# Patient Record
Sex: MALE | Race: WHITE | NOT HISPANIC OR LATINO | Employment: UNEMPLOYED | URBAN - NONMETROPOLITAN AREA
[De-identification: names, ages, dates, MRNs, and addresses within clinical notes are randomized per-mention and may not be internally consistent; named-entity substitution may affect disease eponyms.]

---

## 2018-05-09 ENCOUNTER — OFFICE VISIT CONVERTED (OUTPATIENT)
Dept: FAMILY MEDICINE CLINIC | Age: 68
End: 2018-05-09
Attending: NURSE PRACTITIONER

## 2019-01-04 ENCOUNTER — HOSPITAL ENCOUNTER (OUTPATIENT)
Dept: OTHER | Facility: HOSPITAL | Age: 69
Discharge: HOME | End: 2019-01-04
Attending: NURSE PRACTITIONER

## 2019-01-04 ENCOUNTER — OFFICE VISIT CONVERTED (OUTPATIENT)
Dept: FAMILY MEDICINE CLINIC | Age: 69
End: 2019-01-04
Attending: NURSE PRACTITIONER

## 2019-01-04 LAB
ALBUMIN SERPL-MCNC: 4.5 G/DL (ref 3.5–5)
ALBUMIN/GLOB SERPL: 1.5 {RATIO} (ref 1.4–2.6)
ALP SERPL-CCNC: 72 U/L (ref 56–155)
ALT SERPL-CCNC: 26 U/L (ref 10–40)
ANION GAP SERPL CALC-SCNC: 22 MMOL/L (ref 8–19)
AST SERPL-CCNC: 23 U/L (ref 15–50)
BILIRUB SERPL-MCNC: 0.42 MG/DL (ref 0.2–1.3)
BUN SERPL-MCNC: 10 MG/DL (ref 5–25)
BUN/CREAT SERPL: 12 {RATIO} (ref 6–20)
CALCIUM SERPL-MCNC: 8.9 MG/DL (ref 8.7–10.4)
CHLORIDE SERPL-SCNC: 95 MMOL/L (ref 99–111)
CHOLEST SERPL-MCNC: 197 MG/DL (ref 107–200)
CHOLEST/HDLC SERPL: 3.3 {RATIO} (ref 3–6)
CONV CO2: 22 MMOL/L (ref 22–32)
CONV TOTAL PROTEIN: 7.6 G/DL (ref 6.3–8.2)
CREAT UR-MCNC: 0.85 MG/DL (ref 0.7–1.2)
ERYTHROCYTE [DISTWIDTH] IN BLOOD BY AUTOMATED COUNT: 11.9 % (ref 11.5–14.5)
GFR SERPLBLD BASED ON 1.73 SQ M-ARVRAT: >60 ML/MIN/{1.73_M2}
GLOBULIN UR ELPH-MCNC: 3.1 G/DL (ref 2–3.5)
GLUCOSE SERPL-MCNC: 85 MG/DL (ref 70–99)
HBA1C MFR BLD: 16.5 G/DL (ref 14–18)
HCT VFR BLD AUTO: 47.3 % (ref 42–52)
HDLC SERPL-MCNC: 59 MG/DL (ref 40–60)
LDLC SERPL CALC-MCNC: 115 MG/DL (ref 70–100)
MCH RBC QN AUTO: 30.7 PG (ref 27–31)
MCHC RBC AUTO-ENTMCNC: 34.9 G/DL (ref 33–37)
MCV RBC AUTO: 88.1 FL (ref 80–96)
OSMOLALITY SERPL CALC.SUM OF ELEC: 278 MOSM/KG (ref 273–304)
PLATELET # BLD AUTO: 263 10*3/UL (ref 130–400)
PMV BLD AUTO: 9 FL (ref 7.4–10.4)
POTASSIUM SERPL-SCNC: 4 MMOL/L (ref 3.5–5.3)
PSA SERPL-MCNC: 2.41 NG/ML (ref 0–4)
RBC # BLD AUTO: 5.37 10*6/UL (ref 4.7–6.1)
SODIUM SERPL-SCNC: 135 MMOL/L (ref 135–147)
TRIGL SERPL-MCNC: 117 MG/DL (ref 40–150)
VLDLC SERPL-MCNC: 23 MG/DL (ref 5–37)
WBC # BLD AUTO: 7.18 10*3/UL (ref 4.8–10.8)

## 2019-09-03 ENCOUNTER — OFFICE VISIT CONVERTED (OUTPATIENT)
Dept: FAMILY MEDICINE CLINIC | Age: 69
End: 2019-09-03
Attending: FAMILY MEDICINE

## 2019-09-04 LAB
ALBUMIN SERPL-MCNC: 4.5 G/DL
ALBUMIN/GLOB SERPL: 1.4 {RATIO}
ALP SERPL-CCNC: 83 IU/L
ALT SERPL-CCNC: 21 IU/L
AST SERPL-CCNC: 19 IU/L
BILIRUB SERPL-MCNC: 0.9 MG/DL
BUN SERPL-MCNC: 12 MG/DL
BUN/CREAT SERPL: 13
CALCIUM SERPL-MCNC: 9.2 MG/DL
CHLORIDE SERPL-SCNC: 94 MMOL/L
CHOLEST SERPL-MCNC: 229 MG/DL
CO2 SERPL-SCNC: 23 MMOL/L
CONV TOTAL PROTEIN: 7.7 G/DL
CREAT UR-MCNC: 0.96 MG/DL
GLOBULIN UR ELPH-MCNC: 3.2 G/DL
GLUCOSE SERPL-MCNC: 94 MG/DL
HDLC SERPL-MCNC: 61 MG/DL
LDLC SERPL CALC-MCNC: 147 MG/DL
POTASSIUM SERPL-SCNC: 4.6 MMOL/L
SODIUM SERPL-SCNC: 134 MMOL/L
TRIGL SERPL-MCNC: 105 MG/DL
VLDLC SERPL-MCNC: 21 MG/DL

## 2019-10-07 ENCOUNTER — OFFICE VISIT CONVERTED (OUTPATIENT)
Dept: FAMILY MEDICINE CLINIC | Age: 69
End: 2019-10-07
Attending: FAMILY MEDICINE

## 2020-03-03 ENCOUNTER — OFFICE VISIT CONVERTED (OUTPATIENT)
Dept: FAMILY MEDICINE CLINIC | Age: 70
End: 2020-03-03
Attending: FAMILY MEDICINE

## 2020-05-15 ENCOUNTER — OFFICE VISIT CONVERTED (OUTPATIENT)
Dept: FAMILY MEDICINE CLINIC | Age: 70
End: 2020-05-15
Attending: FAMILY MEDICINE

## 2020-06-30 ENCOUNTER — HOSPITAL ENCOUNTER (OUTPATIENT)
Dept: OTHER | Facility: HOSPITAL | Age: 70
Discharge: HOME | End: 2020-06-30
Attending: FAMILY MEDICINE

## 2020-06-30 ENCOUNTER — OFFICE VISIT CONVERTED (OUTPATIENT)
Dept: FAMILY MEDICINE CLINIC | Age: 70
End: 2020-06-30
Attending: FAMILY MEDICINE

## 2020-06-30 LAB
ALBUMIN SERPL-MCNC: 4.7 G/DL (ref 3.5–5)
ALBUMIN/GLOB SERPL: 1.6 {RATIO} (ref 1.4–2.6)
ALP SERPL-CCNC: 65 U/L (ref 56–155)
ALT SERPL-CCNC: 41 U/L (ref 10–40)
ANION GAP SERPL CALC-SCNC: 22 MMOL/L (ref 8–19)
AST SERPL-CCNC: 57 U/L (ref 15–50)
BASOPHILS # BLD MANUAL: 0.03 10*3/UL (ref 0–0.2)
BASOPHILS NFR BLD MANUAL: 0.3 % (ref 0–3)
BILIRUB SERPL-MCNC: 2.02 MG/DL (ref 0.2–1.3)
BUN SERPL-MCNC: 10 MG/DL (ref 5–25)
BUN/CREAT SERPL: 12 {RATIO} (ref 6–20)
CALCIUM SERPL-MCNC: 9.9 MG/DL (ref 8.7–10.4)
CHLORIDE SERPL-SCNC: 90 MMOL/L (ref 99–111)
CHOLEST SERPL-MCNC: 189 MG/DL (ref 107–200)
CHOLEST/HDLC SERPL: 1.5 {RATIO} (ref 3–6)
CONV CO2: 22 MMOL/L (ref 22–32)
CONV TOTAL PROTEIN: 7.6 G/DL (ref 6.3–8.2)
CREAT UR-MCNC: 0.81 MG/DL (ref 0.7–1.2)
DEPRECATED RDW RBC AUTO: 41.4 FL
EOSINOPHIL # BLD MANUAL: 0.05 10*3/UL (ref 0–0.7)
EOSINOPHIL NFR BLD MANUAL: 0.5 % (ref 0–7)
ERYTHROCYTE [DISTWIDTH] IN BLOOD BY AUTOMATED COUNT: 12.7 % (ref 11.5–14.5)
FOLATE SERPL-MCNC: 7.2 NG/ML (ref 4.8–20)
GFR SERPLBLD BASED ON 1.73 SQ M-ARVRAT: >60 ML/MIN/{1.73_M2}
GLOBULIN UR ELPH-MCNC: 2.9 G/DL (ref 2–3.5)
GLUCOSE SERPL-MCNC: 105 MG/DL (ref 70–99)
GRANS (ABSOLUTE): 7.12 10*3/UL (ref 2–8)
GRANS: 74.8 % (ref 30–85)
HBA1C MFR BLD: 14.4 G/DL (ref 14–18)
HCT VFR BLD AUTO: 40.3 % (ref 42–52)
HDLC SERPL-MCNC: 126 MG/DL (ref 40–60)
IMM GRANULOCYTES # BLD: 0.02 10*3/UL (ref 0–0.54)
IMM GRANULOCYTES NFR BLD: 0.2 % (ref 0–0.43)
LDLC SERPL CALC-MCNC: 55 MG/DL (ref 70–100)
LYMPHOCYTES # BLD MANUAL: 1.43 10*3/UL (ref 1–5)
LYMPHOCYTES NFR BLD MANUAL: 9.2 % (ref 3–10)
MAGNESIUM SERPL-MCNC: 2.08 MG/DL (ref 1.6–2.3)
MCH RBC QN AUTO: 32.1 PG (ref 27–31)
MCHC RBC AUTO-ENTMCNC: 35.7 G/DL (ref 33–37)
MCV RBC AUTO: 89.8 FL (ref 80–96)
MONOCYTES # BLD AUTO: 0.88 10*3/UL (ref 0.2–1.2)
OSMOLALITY SERPL CALC.SUM OF ELEC: 269 MOSM/KG (ref 273–304)
PLATELET # BLD AUTO: 205 10*3/UL (ref 130–400)
PMV BLD AUTO: 8.2 FL (ref 7.4–10.4)
POTASSIUM SERPL-SCNC: 4.2 MMOL/L (ref 3.5–5.3)
PSA SERPL-MCNC: 4.09 NG/ML (ref 0–4)
RBC # BLD AUTO: 4.49 10*6/UL (ref 4.7–6.1)
SODIUM SERPL-SCNC: 130 MMOL/L (ref 135–147)
T4 FREE SERPL-MCNC: 1.3 NG/DL (ref 0.9–1.8)
TRIGL SERPL-MCNC: 42 MG/DL (ref 40–150)
TSH SERPL-ACNC: 2.09 M[IU]/L (ref 0.27–4.2)
VARIANT LYMPHS NFR BLD MANUAL: 15 % (ref 20–45)
VIT B12 SERPL-MCNC: 380 PG/ML (ref 211–911)
VLDLC SERPL-MCNC: 8 MG/DL (ref 5–37)
WBC # BLD AUTO: 9.53 10*3/UL (ref 4.8–10.8)

## 2020-07-08 ENCOUNTER — HOSPITAL ENCOUNTER (OUTPATIENT)
Dept: OTHER | Facility: HOSPITAL | Age: 70
Discharge: HOME | End: 2020-07-08
Attending: FAMILY MEDICINE

## 2020-07-08 LAB
ANION GAP SERPL CALC-SCNC: 15 MMOL/L (ref 8–19)
BUN SERPL-MCNC: 13 MG/DL (ref 5–25)
BUN/CREAT SERPL: 15 {RATIO} (ref 6–20)
CALCIUM SERPL-MCNC: 9 MG/DL (ref 8.7–10.4)
CHLORIDE SERPL-SCNC: 99 MMOL/L (ref 99–111)
CONV CO2: 28 MMOL/L (ref 22–32)
CREAT UR-MCNC: 0.85 MG/DL (ref 0.7–1.2)
GFR SERPLBLD BASED ON 1.73 SQ M-ARVRAT: >60 ML/MIN/{1.73_M2}
GLUCOSE SERPL-MCNC: 81 MG/DL (ref 70–99)
OSMOLALITY SERPL CALC.SUM OF ELEC: 285 MOSM/KG (ref 273–304)
OSMOLALITY UR: 487 MOSM/KG (ref 50–1400)
POTASSIUM SERPL-SCNC: 4.4 MMOL/L (ref 3.5–5.3)
SODIUM SERPL-SCNC: 138 MMOL/L (ref 135–147)
SODIUM UR-SCNC: 35 MMOL/L

## 2020-07-10 ENCOUNTER — HOSPITAL ENCOUNTER (OUTPATIENT)
Dept: OTHER | Facility: HOSPITAL | Age: 70
Discharge: HOME | End: 2020-07-10
Attending: FAMILY MEDICINE

## 2020-08-03 ENCOUNTER — HOSPITAL ENCOUNTER (OUTPATIENT)
Dept: OTHER | Facility: HOSPITAL | Age: 70
Discharge: HOME | End: 2020-08-03
Attending: FAMILY MEDICINE

## 2020-08-05 ENCOUNTER — OFFICE VISIT CONVERTED (OUTPATIENT)
Dept: SURGERY | Facility: CLINIC | Age: 70
End: 2020-08-05
Attending: NURSE PRACTITIONER

## 2020-08-05 ENCOUNTER — HOSPITAL ENCOUNTER (OUTPATIENT)
Dept: SURGERY | Facility: CLINIC | Age: 70
Discharge: HOME | End: 2020-08-05
Attending: NURSE PRACTITIONER

## 2020-08-05 LAB
APPEARANCE UR: ABNORMAL
BILIRUB UR QL: NEGATIVE
CASTS URNS QL MICRO: ABNORMAL /[LPF]
COLOR UR: YELLOW
CONV BACTERIA: ABNORMAL
CONV COLLECTION SOURCE (UA): ABNORMAL
CONV UROBILINOGEN IN URINE BY AUTOMATED TEST STRIP: 1 {EHRLICHU}/DL (ref 0.1–1)
GLUCOSE UR QL: NEGATIVE MG/DL
HGB UR QL STRIP: ABNORMAL
KETONES UR QL STRIP: NEGATIVE MG/DL
LEUKOCYTE ESTERASE UR QL STRIP: ABNORMAL
NITRITE UR QL STRIP: NEGATIVE
PH UR STRIP.AUTO: 7.5 [PH] (ref 5–8)
PROT UR QL: ABNORMAL MG/DL
RBC #/AREA URNS HPF: ABNORMAL /[HPF]
SP GR UR: 1.02 (ref 1–1.03)
WBC #/AREA URNS HPF: ABNORMAL /[HPF]

## 2020-08-08 LAB
AMPICILLIN SUSC ISLT: <=0.25
BACTERIA UR CULT: ABNORMAL
CEFOTAXIME SUSC ISLT: <=0.12
CEFTRIAXONE SUSC ISLT: <=0.12
LEVOFLOXACIN SUSC ISLT: 1
PENICILLIN G SUSC ISLT: <=0.06
TETRACYCLINE SUSC ISLT: <=0.25
TIGECYCLINE SUSC ISLT: <=0.06
VANCOMYCIN SUSC ISLT: 0.25

## 2020-08-26 ENCOUNTER — TELEPHONE CONVERTED (OUTPATIENT)
Dept: SURGERY | Facility: CLINIC | Age: 70
End: 2020-08-26
Attending: NURSE PRACTITIONER

## 2020-09-03 ENCOUNTER — HOSPITAL ENCOUNTER (OUTPATIENT)
Dept: OTHER | Facility: HOSPITAL | Age: 70
Discharge: HOME | End: 2020-09-03
Attending: FAMILY MEDICINE

## 2020-09-03 LAB
CREAT BLD-MCNC: 0.8 MG/DL (ref 0.6–1.4)
GFR SERPLBLD BASED ON 1.73 SQ M-ARVRAT: >60 ML/MIN/{1.73_M2}

## 2020-11-04 ENCOUNTER — HOSPITAL ENCOUNTER (OUTPATIENT)
Dept: OTHER | Facility: HOSPITAL | Age: 70
Discharge: HOME | End: 2020-11-04
Attending: NURSE PRACTITIONER

## 2021-03-03 ENCOUNTER — OFFICE VISIT CONVERTED (OUTPATIENT)
Dept: FAMILY MEDICINE CLINIC | Age: 71
End: 2021-03-03
Attending: FAMILY MEDICINE

## 2021-03-18 ENCOUNTER — CONVERSION ENCOUNTER (OUTPATIENT)
Dept: FAMILY MEDICINE CLINIC | Age: 71
End: 2021-03-18

## 2021-03-19 LAB
ALBUMIN SERPL-MCNC: 4.2 G/DL
ALBUMIN/GLOB SERPL: 1.6 {RATIO}
ALP SERPL-CCNC: 71 IU/L
ALT SERPL-CCNC: 16 IU/L
AST SERPL-CCNC: 19 IU/L
BILIRUB SERPL-MCNC: 0.5 MG/DL
BUN SERPL-MCNC: 16 MG/DL
BUN/CREAT SERPL: 17
CALCIUM SERPL-MCNC: 9.2 MG/DL
CHLORIDE SERPL-SCNC: 104 MMOL/L
CHOLEST SERPL-MCNC: 195 MG/DL
CO2 SERPL-SCNC: 27 MMOL/L
CONV TOTAL PROTEIN: 6.9 G/DL
CREAT UR-MCNC: 0.92 MG/DL
GLOBULIN UR ELPH-MCNC: 2.7 G/DL
GLUCOSE SERPL-MCNC: 89 MG/DL
HDLC SERPL-MCNC: 54 MG/DL
LDLC SERPL CALC-MCNC: 129 MG/DL
POTASSIUM SERPL-SCNC: 4.6 MMOL/L
PSA SERPL-MCNC: 2.2 NG/ML
SODIUM SERPL-SCNC: 143 MMOL/L
TRIGL SERPL-MCNC: 67 MG/DL
VLDLC SERPL-MCNC: 12 MG/DL

## 2021-05-10 NOTE — H&P
"   History and Physical      Patient Name: Percy Mcdaniel   Patient ID: [id number]   Sex: Male   YOB: 1950    Primary Care Provider: Natalie Pierce MD   Referring Provider: Natalie Pierce MD    Visit Date: August 5, 2020    Provider: LIVIA Escobar   Location[facility]   Location Address: [address] [place] [address]  [id number]   Location Phone: [phone]          Chief Complaint  · Decreased urinary stream when voiding  · \"Getting up frequently at night\"  · Elevated PSA  · \"My doctor sent me because my blood test is high\"      History Of Present Illness  The patient is a 70 year old /White male, who presents on referral from Natalie Pierce MD, for an urological evaluation for an elevated PSA. The PSA was noted as elevated on 06/30/2020 and stated to be mildly elevated and at a level of 4.09 . There has not been a repeat PSA since the last elevated one on 06/30/2020   The patient also reports slowing of his stream and nocturia, 3-4 times a night. Additionally, he denies burning, chills, fever, frequency, hematuria, hesitancy, incomplete emptying, and previous UTI's.   The patient is currently not taking any Urology specific medications.   Petinent studies:  To date, no diagnostic studies have been performed. He has not had any recent care for this condition.      Patient reports that he is having trouble with his urinary stream cuts out at the end of his void.      Denies any post void dribbling.      Admits to Nocturia 3-4 x/night.      Denies taking any urology meds.      Admits to a brother and uncle with prostate cancer.    CT abd w/wo contrast (8/2020) reveals:  1.  Lesion in segment 2 of the liver that may relate to hemangioma although difficult to confirm on this exam. MRI recommended to better reassess this.  2.  Previously noted gallbladder polyp or stone not well imaged on this exam.  3.  Bilateral renal cyst.    4.  There is a 1.08 cm calculi in the right mid " pole kidney.    Previous psa's:  6/2020: 4.09  1/2019: 2.41  10/2017: 3.3  7/22016: 3.3  11/2014: 1.7       Past Medical History  Disease Name Date Onset Notes   Allergic rhinitis, chronic --  --    Allergies --  --    High blood pressure --  --    Hypertension --  --          Past Surgical History  Procedure Name Date Notes   Nasal polyp surgery --  --    Vasectomy 1980 --          Allergy List  Allergen Name Date Reaction Notes   NO KNOWN DRUG ALLERGIES --  --  --          Family Medical History  Disease Name Relative/Age Notes   Stomach Neoplasm, Malignant Aunt/   --    Diabetes, unspecified type  Uncle (paternal)   Lung cancer  --    Prostate cancer Brother/   Uncle (maternal); Brother   Family history of breast cancer  Aunt/50s         Social History  Finding Status Start/Stop Quantity Notes   Tobacco Never --/-- --  05/10/2017 - never 04/12/2017 - never          Review of Systems  · Constitutional  o Denies  o : fever, chills  · HENT  o Denies  o : sore throat, nasal congestion, nasal discharge, sinus pain, headaches  · Cardiovascular  o Denies  o : chest pain, cardiac murmurs, irregular heart beats, dyspnea on exertion  · Respiratory  o Denies  o : shortness of breath, wheezing  · Gastrointestinal  o Denies  o : nausea, vomiting, change in abdominal girth, diarrhea, constipation, blood in stools  · Genitourinary  o Admits  o : nocturia  o Denies  o : urgency, frequency, dysuria, hematuria, pyuria, oliguria, change in urine color, incontinence, urinary retention, difficulty voiding, urinary hesitancy, decreased stream, post-void dribbling, scrotal pain, penile discharge, additional symptoms, except as noted in HPI  · Integument  o Denies  o : rash, itching, new skin lesions  · Neurologic  o Denies  o : tingling or numbness, incoordination, seizures  · Endocrine  o Denies  o : polyuria, polydipsia, cold intolerance, heat intolerance, weight gain, weight loss  · Psychiatric  o Denies  o : anxiety,  "depression      Vitals  Date Time BP Position Site L\R Cuff Size HR RR TEMP (F) WT  HT  BMI kg/m2 BSA m2 O2 Sat HC       08/05/2020 10:24 AM       12  199lbs 6oz 6'  1\" 26.3 2.16           Physical Examination  · Constitutional  o Appearance  o : Well nourished, well developed patient in no acute distress. Ambulating without difficulty.  · Neck  o Thyroid  o : Normal size without tenderness, nodules or masses  · Respiratory  o Respiratory Effort  o : Breathing is unlabored without accessory muscle use  o Auscultation of Lungs  o : Normal breath sounds  · Gastrointestinal  o Abdominal Examination  o : Scaphoid abdomen which is non-tender to palpation with normal tone and without rigidity or guarding. Normal bowel sounds. No masses present.  o Liver and spleen  o : No hepatomegaly present. Liver is non-tender to palpation and spleen is not palpable.  o Hernias  o : No abdominal wall hernias are present.  · Genitourinary  o Penis  o : Normal appearance without lesion, discharge or masses.  · Lymphatic  o Neck  o : No lymphadenopathy present  o Axilla  o : No lymphadenopathy present  o Groin  o : No lymphadenopathy present  · Skin and Subcutaneous Tissue  o General Inspection  o : No rashes, lesions or areas of discoloration present. Skin turgor is normal.  o General Palpation  o : No abnormalities, masses or tenderness on palpation.          Results  · In-Office Procedures  o Lab procedure  § Automated Dipstick Urinalysis (Surg Spec) WITHOUT Micro [facility] ([id number])   § Color Ur: Yellow   § Clarity Ur: Clear   § Glucose Ur Ql Strip: Negative   § Bilirub Ur Ql Strip: Negative   § Ketones Ur Ql Strip: Negative   § Sp Gr Ur Qn: 1.015   § Hgb Ur Ql Strip: Trace-Intact   § pH Ur-LsCnc: 7.5   § Prot Ur Ql Strip: 30 mg/dL   § Urobilinogen Ur Strip-mCnc: 2.0 E.U./dL   § Nitrite Ur Ql Strip: Negative   § WBC Est Ur Ql Strip: Large       Assessment  · Elevated prostate specific antigen " (PSA)     790.93/R97.20  · Nocturia     788.43/R35.1  · Urinary stream slowing     788.62/R39.198  · Liver lesion     573.8/K76.9      Plan  · Orders  o Urinalysis with Reflex Microscopy if abnormal ([facility]) ([id number]) - 790.93/R97.20, 788.43/R35.1 - 08/05/2020  o Urine Culture (Clean Catch) [facility] ([id number]) - 790.93/R97.20, 788.43/R35.1 - 08/05/2020  o MRI prostate wo then w contrast ([id number]) - 790.93/R97.20, 788.43/R35.1, 788.62/R39.198 - 08/05/2020   To be performed @ [facility]  o MRI abdomen w/ contrast ([id number]) - 573.8/K76.9 - 08/05/2020   To be performed @ [facility]   · Medications  o Medications have been Reconciled  o Transition of Care or Provider Policy  · Instructions  o DISCUSSION AND PLAN: I have explained that there is no PSA level which patient can be told that he does not have prostate cancer. I have discussed that prostate biopsy is the only way to diagnose prostate cancer. Given patient's family history of prostate cancer he is wishing to proceed with MRI.  o Kidney stones will be addressed at follow-up after imaging.   o Will follow-up with a telephone visit post imaging.   o Electronically Identified Patient Education Materials Provided Electronically            Electronically Signed by: LIVIA Escobar -Author on August 5, 2020 08:00:08 PM

## 2021-05-13 NOTE — PROGRESS NOTES
"   Quick Note      Patient Name: Percy Mcdaniel   Patient ID: [id number]   Sex: Male   YOB: 1950    Primary Care Provider: Natalie Pierce MD   Referring Provider: Natalie Pierce MD    Visit Date: August 26, 2020    Provider: LIVIA Escobar   Location: [facility]   Location Address: [address] [place] [address]  [id number]   Location Phone: [phone]          History Of Present Illness  TELEHEALTH TELEPHONE VISIT  Chief Complaint: follow-up post imaging   Percy Mcdaniel is a 70 year old /White male who is presenting for evaluation via telehealth telephone visit. Verbal consent obtained before beginning visit.   Provider spent 15 minutes minutes with the patient during the telehealth visit.   The following staff were present during this visit: Ayala Esparza- MICHELLE   Past Medical History/ Overview of Patient Symptoms     This is a telephone visit to discuss MRI of the prostate.    MRI of prostate reveals:  1. Prostate measures 5.1 x 3.9 x 4.3cm with total volume of 41.15mL.  2. Heterogeneous signal intensity and enhancement is in the enlarged central gland is related to BPH.  3.  No suspicious T2 localizing lesion to suggest significant prostate malignancy given limitations of motion artifact and susceptibility artifact from air in the rectum.  4.  Chronic prostatitis.       Vitals  Date Time BP Position Site L\R Cuff Size HR RR TEMP (F) WT  HT  BMI kg/m2 BSA m2 O2 Sat FR L/min FiO2 HC       08/26/2020 11:33 AM         200lbs 0oz 6'  1\" 26.39 2.16                 Assessment  · Elevated PSA     790.93/R97.20  · BPH (benign prostatic hyperplasia)     600.00/N40.0  · Chronic prostatitis     601.1/N41.1    Problems Reconciled  Plan  · Orders  o Physician Telephone Evaluation, 11-20 minutes (54742) - 790.93/R97.20, 600.00/N40.0, 601.1/N41.1 - 08/26/2020  o PSA ultrasensitive DIAGNOSTIC [facility] ([id number]) - 790.93/R97.20, 600.00/N40.0, 601.1/N41.1 - " 10/20/2020  · Medications  o Medications have been Reconciled  o Transition of Care or Provider Policy  · Instructions  o Plan Of Care: Start 21 days of Bactrim. And will redraw PSA after completing antibiotics.  o Call the office with any concerns or questions.  o Patient will notify the office after completing antibiotics.  o Electronically Identified Patient Education Materials Provided Electronically  · Disposition  o Call or Return if symptoms worsen or persist.            Electronically Signed by: LIVIA Escobar -Author on September 30, 2020 01:33:28 PM

## 2021-05-14 VITALS — WEIGHT: 200 LBS | BODY MASS INDEX: 26.51 KG/M2 | HEIGHT: 73 IN

## 2021-05-15 VITALS — BODY MASS INDEX: 26.42 KG/M2 | RESPIRATION RATE: 12 BRPM | WEIGHT: 199.37 LBS | HEIGHT: 73 IN

## 2021-05-18 NOTE — PROGRESS NOTES
"Percy Mcdaniel. 1950     Office/Outpatient Visit    Visit Date: Tue, Sep 3, 2019 08:44 am    Provider: Natalie Pierce MD (Assistant: Perla Talamantes MA)    Location: [facility]        Electronically signed by Natalie Pierce MD on  09/03/2019 09:25:11 AM                             SUBJECTIVE:        CC:     Mr. Mcdaniel is a 69 year old White male.  Patient presents today to establish care         HPI: Percy is here today to establish care with me.  I see his wife Adrienne.        He is on amlodipine, benazepril, and triamterene/HCTZ for HTN.  BP has been well controlled.  No CP, palpitations, SOB.  He is due for labs.  He has 2 rounds of cardiac testing with stress tests.  These were all unremarkable.  He has been interested in backing off on some of the meds.  He has gone down to 1/2 tab of the amlodopine, then down to QOD.  He has been getting around 130/75 BP at home.          He does have a history of anxiety.  \"I'm a worrier.\"  Dr. Villasenor gave him a bottle of 10 little pills that he just carries around with him.        He does have chronic seasonal allergies for which he is on Nasacort as well as OTC antihistamines.  His allergies have been much better since starting Allegra.  He finds that this helps him the most without any sedation.  He had nasal polyps removed a couple of years ago by Dr. Holloway and that has really helped as well.        No falls in the past year.     ROS:     CONSTITUTIONAL:  Negative for fatigue and fever.      EYES:  Negative for blurred vision.      E/N/T:  Positive for nasal congestion and frequent rhinorrhea.   Negative for diminished hearing.      CARDIOVASCULAR:  Negative for chest pain and palpitations.      RESPIRATORY:  Negative for recent cough and dyspnea.      GASTROINTESTINAL:  Negative for abdominal pain, constipation, diarrhea, nausea and vomiting.      MUSCULOSKELETAL:  Negative for arthralgias and myalgias.      PSYCHIATRIC:  Positive for feelings " of stress.          PMH/FMH/SH:     Last Reviewed on 2019 08:49 AM by Natalie Pierce    Past Medical History:             PAST MEDICAL HISTORY         Hypertension     Allergies         PREVENTIVE HEALTH MAINTENANCE             COLORECTAL CANCER SCREENING: Up to date (colonoscopy q10y; sigmoidoscopy q5y; Cologuard q3y) was last done 2018, Results are in chart; colonoscopy with normal results; diverticulosis     DENTAL CLEANING: was last done  6 months     [facility]     Hepatitis C Medicare Screening: was last done 10/2017     PSA: was last done          PAST MEDICAL HISTORY             CURRENT MEDICAL PROVIDERS:    Dermatologist: Derm -    E/N/T: Amie         Surgical History:         Sinus surgery with debridement of nasal polyps 2017;         Family History:     Father: Hypertension;  stomach     Mother:  at age 78;  Lung Cancer     Brother(s): Prostate Cancer ( treated-cancer free currently )         Social History:     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          Alcohol: Frequency: Daily When he drinks, the average quantity of alcohol is 2 drinks.   He typically consumes beer, wine, and bourbon.      Caffeine:  He admits to consuming caffeine via coffee ( 2 servings per week ) and tea ( 4 servings per day ).          Substance Abuse History:     Last Reviewed on 2019 08:49 AM by Natalie Pierce    NEGATIVE         Mental Health History:     Last Reviewed on 2019 08:49 AM by Natalie Pierce    NEGATIVE         Communicable Diseases (eg STDs):     Last Reviewed on 2019 08:49 AM by Natalie Pierce    Reportable health conditions; NEGATIVE             Current Problems:     Last Reviewed on 2019 08:56 AM by Aruna Armstrong    Diverticulosis      Essential hypertension, unspecified     Seasonal allergies         Immunizations:     Prevnar 13 (Pneumococcal PCV 13) 10/16/2017     Fluzone High-Dose pf (>=65 yr) 10/16/2017     Fluzone High-Dose pf (>=65 yr) 1/4/2019     PNEUMOVAX 23 (Pneumococcal PPV23) 1/4/2019         Allergies:     Last Reviewed on 1/04/2019 08:56 AM by Aruna Armstrong      No Known Drug Allergies.         Current Medications:     Last Reviewed on 1/04/2019 08:56 AM by Aruna Armstrong    Amlodipine  5mg Tablet 1 tab daily     Benazepril HCl 40mg Tablet Take 1 tablet(s) by mouth daily     Triamterene/Hydrochlorothiazide 37.5mg/25mg Tablet one a day     Nasacort Allergy 24hr 55mcg/1actuation Nasal Spray 1 spray(s) in each nostril daily     Aspirin (ASA) 81mg Tablets, Enteric Coated 1 tab daily     OTC allergy relief         OBJECTIVE:        Vitals:         Current: 9/3/2019 8:47:43 AM    Ht:  6 ft, 0 in;  Wt: 239.2 lbs;  BMI: 32.4    T: 97.4 F (oral);  BP: 146/82 mm Hg (left arm, sitting);  P: 75 bpm (left arm (BP Cuff), sitting);  sCr: 0.85 mg/dL;  GFR: 94.42        Repeat:     9:08:39 AM     BP:   143/85mm Hg (left arm, sitting)         Exams:     PHYSICAL EXAM:     GENERAL: Vitals recorded well developed, well nourished;  well groomed;  no apparent distress;     EYES: extraocular movements intact; conjunctiva and cornea are normal; PERRLA;     E/N/T: OROPHARYNX:  normal mucosa, dentition, gingiva, and posterior pharynx;     RESPIRATORY: normal respiratory rate and pattern with no distress; normal breath sounds with no rales, rhonchi, wheezes or rubs;     CARDIOVASCULAR: normal rate; rhythm is regular;  no systolic murmur; no edema;     GASTROINTESTINAL: nontender; normal bowel sounds;     MUSCULOSKELETAL: normal gait; normal overall tone         ASSESSMENT           401.9   I10  Essential hypertension, unspecified              DDx:     477.0   J30.9  Seasonal allergies              DDx:         ORDERS:         Meds Prescribed:        Refill of: Benazepril HCl 40mg Tablet Take 1 tablet(s) by mouth daily  #90 (Ninety) tablet(s) Refills: 1       Refill of: Triamterene/Hydrochlorothiazide 37.5mg/25mg Tablet one a day  #90 (Ninety) tablet(s) Refills: 1         Radiology/Test Orders:       3017F  Colorectal CA screen results documented and reviewed (PV)  (In-House)           Lab Orders:       [id number]  HTN - [facility] CMP AND LIPID: [id number]  (Send-Out)         APPTO  Appointment need  (In-House)           Other Orders:       1101F  Pt screen for fall risk; document no falls in past year or only 1 fall w/o injury in past year ([place])  (In-House)                   PLAN:          Essential hypertension, unspecified BP at goal at home despite pretty  much being off the amlodipine.  Will have him go ahead and stop this, checking BP at home so we can check his response.  I would like to facilitate his desire to get off some of his meds if we can do so while keeping his BP at goal.  Discussed weight loss, exercise, low salt diet as other means to decrease.  Refills sent.  Checking labs.  RTC 6 months for annual physical.     LABORATORY:  Labs ordered to be performed today include HTN/Lipid Panel: CMP, Lipid.  ONEL Has had no falls in the past year Vaccines Flu and Pneumonia updated in Shot record Colorectal Cancer Screening is up to date and the results are in the chart     FOLLOW-UP: Schedule a follow-up visit in 6 months..  annual physical 30 min with Stan           Prescriptions:       Refill of: Benazepril HCl 40mg Tablet Take 1 tablet(s) by mouth daily  #90 (Ninety) tablet(s) Refills: 1       Refill of: Triamterene/Hydrochlorothiazide 37.5mg/25mg Tablet one a day  #90 (Ninety) tablet(s) Refills: 1           Orders:       [id number]  HTN - [facility] CMP AND LIPID: [id number]  (Send-Out)         3017F  Colorectal CA screen results documented and reviewed (PV)  (In-House)         APPTO  Appointment need  (In-House)         1101F  Pt screen for fall risk; document  no falls in past year or only 1 fall w/o injury in past year ([place])  (In-House)            Seasonal allergies Stable.  He has been doing well with the Nasacort.  No refills needed today.             Patient Recommendations:        For  Essential hypertension, unspecified:     Schedule a follow-up visit in 6 months.                APPOINTMENT INFORMATION:        Monday Tuesday Wednesday Thursday Friday Saturday Sunday            Time:___________________AM  PM   Date:_____________________             CHARGE CAPTURE           **Please note: ICD descriptions below are intended for billing purposes only and may not represent clinical diagnoses**        Primary Diagnosis:         401.9 Essential hypertension, unspecified            I10    Essential (primary) hypertension              Orders:          02430   Office/outpatient visit; established patient, level 4  (In-House)             3017F   Colorectal CA screen results documented and reviewed (PV)  (In-House)             APPTO   Appointment need  (In-House)             1101F   Pt screen for fall risk; document no falls in past year or only 1 fall w/o injury in past year ([place])  (In-House)           477.0 Seasonal allergies            J30.9    Allergic rhinitis, unspecified

## 2021-05-18 NOTE — PROGRESS NOTES
"Percy Mcdaniel  1950     Office/Outpatient Visit    Visit Date: Tue, Jun 30, 2020 02:08 pm    Provider: Natalie Pierce MD (Assistant: Marcia Lopez RN)    Location: [facility]        Electronically signed by Natalie Pierce MD on  07/01/2020 10:39:27 AM                             Subjective:        CC: PT IS NOT TAKING LOSARTANJim is a 70 year old White male.  \"I feel like im on speed.\"/confusion/weight loss         HPI: García is here today with acute complaint of confusion and weight loss.  \"I feel like I'm on speed.\"  He felt like the losartan gave him \"a lot of energy.\"  He tends to run for multiple days where he cannot sleep and is jittery.  He feels manic.  Since stopping the losartan, he is starting to feel sleepy again.          By our records, he has lost 30 lbs in the past 3 months, with 12 lbs lost in the past 2 weeks.  He does not really have diarrhea, but he has noticed that he will have multiple BMs daily for a few days, then skips a day or two.  No fevers or chills; he has been checking temps throughout the week and there was a period of time where he was running in the high 99s.  Had a spell of very dry skin, but that seems to have resolved once he started using a really good moisturizer.  No hair loss or changes in nails.  He has a tremor but he has had this for years, reportedly.  However, it has been a lot worse lately.  No family history or personal history of thyroid disorder.    ROS:     CONSTITUTIONAL:  Positive for unintentional weight loss.   Negative for fatigue or fever.      EYES:  Negative for blurred vision.      E/N/T:  Negative for diminished hearing, nasal congestion and frequent rhinorrhea.      CARDIOVASCULAR:  Positive for bradycardia.   Negative for chest pain or palpitations.      RESPIRATORY:  Negative for recent cough and dyspnea.      GASTROINTESTINAL:  Negative for abdominal pain, constipation, diarrhea, nausea and vomiting.      GENITOURINARY:  " Positive for nocturia.   Negative for change in urine stream.      MUSCULOSKELETAL:  Negative for arthralgias and myalgias.      NEUROLOGICAL:  Positive for tremor.          Past Medical History / Family History / Social History:         Last Reviewed on 2020 02:28 PM by Natalie Pierce    Past Medical History:             PAST MEDICAL HISTORY         Hypertension     Allergies         PREVENTIVE HEALTH MAINTENANCE             COLORECTAL CANCER SCREENING: Up to date (colonoscopy q10y; sigmoidoscopy q5y; Cologuard q3y) was last done 2018, Results are in chart; colonoscopy with normal results; diverticulosis     DENTAL CLEANING: was last done  6 months     EYE EXAM: was last done   - 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          Alcohol: Frequency: Daily When he drinks, the average quantity of alcohol is 2 drinks.   He typically consumes beer, wine, and bourbon.      Caffeine:  He admits to consuming caffeine via coffee ( 2 servings per week ) and tea ( 4 servings per day ).          Substance Abuse History:     Last Reviewed on 2020 02:28 PM by Natalie Pierce    NEGATIVE         Mental Health History:     Last Reviewed on 2020 02:28 PM by Natalie Pierce    NEGATIVE         Communicable Diseases (eg STDs):     Last Reviewed  on 6/30/2020 02:28 PM by Natalie Pierce    Reportable health conditions; NEGATIVE         Current Problems:     Last Reviewed on 5/15/2020 09:14 AM by Natalie Pierce    HTN - Essential (primary) hypertension    Allergic rhinitis, unspecified    Diverticulosis of large intestine without perforation or abscess without bleeding        Immunizations:     Influenza, high dose seasonal (FLUZONE HIGH-DOSE 5085-2766) 3/3/2020    Prevnar 13 (Pneumococcal PCV 13) 10/16/2017    Fluzone High-Dose pf (>=65 yr) 10/16/2017    Fluzone High-Dose pf (>=65 yr) 1/4/2019    PNEUMOVAX 23 (Pneumococcal PPV23) 1/4/2019        Allergies:     Last Reviewed on 5/15/2020 09:14 AM by Natalie Pierce    benazepriL: cough  (Adverse Reaction)        Current Medications:     Last Reviewed on 5/15/2020 09:14 AM by Natalie Pierce    Nasacort Allergy 24hr 55mcg/1actuation Nasal Spray [1 spray(s) in each nostril daily  ]    triamterene-hydrochlorothiazid 37.5-25 mg oral tablet [TAKE ONE TABLET BY MOUTH EVERY DAY]    OTC allergy relief      Benadryl Allergy 25 mg oral tablet [1 tab po q 6 hr prn]    losartan 50 mg oral tablet [take 1 tablet (50 mg) by oral route once daily]        Objective:        Vitals:         Current: 6/30/2020 2:15:07 PM    Ht:  6 ft, 0 in;  Wt: 203.6 lbs;  BMI: 27.6T: 96.9 F (temporal);  BP: 150/92 mm Hg (left arm, sitting);  P: 89 bpm (left arm (BP Cuff), sitting);  sCr: 0.96 mg/dL;  GFR: 77.03        Repeat:     2:57:30 PM  BP:   156/91mm Hg (right arm, sitting, HR: 82)     Exams:     PHYSICAL EXAM:     GENERAL: well developed, well nourished;  well groomed;  no apparent distress;     EYES: extraocular movements intact; conjunctiva and cornea are normal;     RESPIRATORY: normal respiratory rate and pattern with no distress; normal breath sounds with no rales, rhonchi, wheezes or rubs;     CARDIOVASCULAR: normal rate; rhythm is regular;  no edema;     MUSCULOSKELETAL: normal overall tone     NEUROLOGIC: mental status: alert  and oriented x 3;     PSYCHIATRIC: appropriate affect and demeanor; normal psychomotor function; normal speech pattern;         Assessment:         R41.82   Altered mental status, unspecified       I10   HTN - Essential (primary) hypertension       Z12.5   Encounter for screening for malignant neoplasm of prostate           ORDERS:         Lab Orders:       [id number]  COMP - [facility] Comp. Metabolic Panel  (Send-Out)            52059  LPDP - [facility] Lipid Panel  (Send-Out)            84424  MG - [facility] Magnesium, Serum  (Send-Out)            96986  B12FO - [facility] Vitamin B12 with Folate  (Send-Out)            61868  BDCBC - [facility] CBC with 3 part diff  (Send-Out)            *  [employer] Medicare screening PSA  (Send-Out)            83155  THYII - [facility] Thyroid panel with TSH (94535, 85410)  (Send-Out)                      Plan:         Altered mental status, unspecifiedUnclear exactly what the etiology of these sx are.  I do not believe that the losartan actually had anything to do with his issues.  This is probably a coincidence, especially since the rapid weight gain has been going on for 3 months now, which predates the losartan's addition by a considerable amount. I think the greatest probability at this point would be thyroid disregulation, so we will check thyroid labs today as well as look for other metabolic or inflammatory causes.  If labs come back unremarkable, we will need to a pursue a full cancer work-up given the rapid and significant weight loss of late.    LABORATORY:  Labs ordered to be performed today include B12 with Folate, CBC, and Thyroid Panel.            Orders:       85900  B12FO - [facility] Vitamin B12 with Folate  (Send-Out)            47371  BDCBC - [facility] CBC with 3 part diff  (Send-Out)            01913  THYII - [facility] Thyroid panel with TSH (39118, 47920)  (Send-Out)              HTN - Essential (primary) hypertensionMildly elevated today.  No changes to meds until we get his labs back.    LABORATORY:  Labs ordered to  be performed today include Comprehensive metabolic panel, lipid panel, and Magnesium level.            Orders:       48742  COMP - [facility] Comp. Metabolic Panel  (Send-Out)            33651  LPDP - [facility] Lipid Panel  (Send-Out)            07433  MG - [facility] Magnesium, Serum  (Send-Out)              Encounter for screening for malignant neoplasm of prostate    LABORATORY:  Labs ordered to be performed today include PSA.            Orders:       *  [employer] Medicare screening PSA  (Send-Out)                  Charge Capture:         Primary Diagnosis:     R41.82  Altered mental status, unspecified           Orders:      46479  Office/outpatient visit; established patient, level 4  (In-House)              I10  HTN - Essential (primary) hypertension     Z12.5  Encounter for screening for malignant neoplasm of prostate

## 2021-05-18 NOTE — PROGRESS NOTES
Percy Mcdaniel 1950     Office/Outpatient Visit    Visit Date: Mon, Oct 7, 2019 02:40 pm    Provider: López Mark MD (Assistant: Marcia Lopez RN)    Location: [facility]        Electronically signed by López Mark MD on  10/07/2019 08:11:33 PM                             SUBJECTIVE:        CC: insect sting         HPI:     García was stung by something yesterday - either a hornet or yellow jacket.  He is not sure.  He had a sting to the R hand and then to the R thigh as well.  He is having ongoing swelling of the R hand.  He has some pain as well.  He has been applying ice to this.  He is getting some relief from that.  He has not had obvious fever or chills.  He denies any breathing issue or swelling of the mouth or tongue.      ROS:     CONSTITUTIONAL:  Negative for chills and fever.      CARDIOVASCULAR:  Negative for chest pain and palpitations.      RESPIRATORY:  Negative for recent cough and dyspnea.      GASTROINTESTINAL:  Negative for abdominal pain, nausea and vomiting.          [facility]/:     Last Reviewed on 10/07/2019 02:59 PM by López Mark    Past Medical History:             PAST MEDICAL HISTORY         Hypertension     Allergies         PREVENTIVE HEALTH MAINTENANCE             COLORECTAL CANCER SCREENING: Up to date (colonoscopy q10y; sigmoidoscopy q5y; Cologuard q3y) was last done 2018, Results are in chart; colonoscopy with normal results; diverticulosis     DENTAL CLEANING: was last done  6 months     EYE EXAM: was last done   - [facility]     Hepatitis C Medicare Screening: was last done 10/2017     PSA: was last done          PAST MEDICAL HISTORY             CURRENT MEDICAL PROVIDERS:    Dermatologist: Derm -    E/N/T: Amie         Surgical History:         Sinus surgery with debridement of nasal polyps 2017;         Family History:     Father: Hypertension;  stomach     Mother:  at age 78;  Lung Cancer     Brother(s): Prostate Cancer  ( treated-cancer free currently )         Social History:     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          Alcohol: Frequency: Daily When he drinks, the average quantity of alcohol is 2 drinks.   He typically consumes beer, wine, and bourbon.      Caffeine:  He admits to consuming caffeine via coffee ( 2 servings per week ) and tea ( 4 servings per day ).          Substance Abuse History:     Last Reviewed on 10/07/2019 02:59 PM by López Mark    NEGATIVE         Mental Health History:     Last Reviewed on 10/07/2019 02:59 PM by López Mark    NEGATIVE         Communicable Diseases (eg STDs):     Last Reviewed on 10/07/2019 02:59 PM by López Mark    Reportable health conditions; NEGATIVE             Current Problems:     Last Reviewed on 10/07/2019 02:59 PM by López Mark    Diverticulosis     Essential hypertension, unspecified     Seasonal allergies         Immunizations:     Prevnar 13 (Pneumococcal PCV 13) 10/16/2017     Fluzone High-Dose pf (>=65 yr) 10/16/2017     Fluzone High-Dose pf (>=65 yr) 1/4/2019     PNEUMOVAX 23 (Pneumococcal PPV23) 1/4/2019         Allergies:     Last Reviewed on 10/07/2019 02:59 PM by López Makr      No Known Drug Allergies.         Current Medications:     Last Reviewed on 10/07/2019 02:59 PM by López Mark    Benazepril HCl 40mg Tablet Take 1 tablet(s) by mouth daily     Triamterene/Hydrochlorothiazide 37.5mg/25mg Tablet one a day     Nasacort Allergy 24hr 55mcg/1actuation Nasal Spray 1 spray(s) in each nostril daily     OTC allergy relief     Benadryl Allergy 25mg Tablet 1 tab po q 6 hr prn         OBJECTIVE:        Vitals:         Current: 10/7/2019 2:48:30 PM    Ht:  6 ft, 0 in;  Wt: 239 lbs;  BMI: 32.4    T: 98.1  F (oral);  BP: 131/80 mm Hg (left arm, sitting);  P: 92 bpm (left arm (BP Cuff), sitting);  sCr: 0.96 mg/dL;  GFR: 83.57        Exams:     PHYSICAL EXAM:     GENERAL: Vitals recorded well developed, well nourished;     EYES: extraocular movements intact; conjunctiva and cornea are normal; PERRL;     E/N/T: EARS:  normal external auditory canals and tympanic membranes;  grossly normal hearing; OROPHARYNX:  normal mucosa, dentition, gingiva, and posterior pharynx;     NECK: range of motion is normal; thyroid is non-palpable;     RESPIRATORY: normal respiratory rate and pattern with no distress; normal breath sounds with no rales, rhonchi, wheezes or rubs;     CARDIOVASCULAR: normal rate; rhythm is regular;  no systolic murmur;     GASTROINTESTINAL: nontender; normal bowel sounds; no masses;     SKIN: fairly significant edema of the R hand and forearm - there is also edema of the R medial thigh - there is some mild redness but not any obvious streaking redness;         ASSESSMENT           914.4   S60.569A  Insect bite on hand(s), except finger(s)              DDx:         ORDERS:         Meds Prescribed:       Prednisone 20mg Tablet Use as directed  #11 (Eleven) tablet(s) Refills: 0                 PLAN:          Insect bite on hand(s), except finger(s)         RECOMMENDATIONS given include: Today, we are going to cover him with oral prednisone as noted below given the presence of a fairly large local reaction.  I don't think there is cellulitis present, but I want him to contact us if he develops fever, chills, or obvious streaking redness in the arm.  I have asked him to use a cool compress as needed, but I would be cautious about too much icing.  We will see how things progress with this..            Prescriptions:       Prednisone 20mg Tablet Use as directed  #11 (Eleven) tablet(s) Refills: 0           Patient Education Handouts:       Insect Bites and Stings              CHARGE CAPTURE           **Please note:  ICD descriptions below are intended for billing purposes only and may not represent clinical diagnoses**        Primary Diagnosis:         914.4 Insect bite on hand(s), except finger(s)            S60.569A    Insect bite (nonvenomous) of unspecified hand, initial encounter              Orders:          06930   Office/outpatient visit; established patient, level 3  (In-House)

## 2021-05-18 NOTE — PROGRESS NOTES
Violeta Percy QUINN  1950     Office/Outpatient Visit    Visit Date: Wed, Mar 3, 2021 08:48 am    Provider: Natalie Pierce MD (Assistant: Marcia Lopez, RN)    Location: [facility]        Electronically signed by Natalie Pierce MD on  03/03/2021 09:35:10 AM                             Subjective:        CC: García is a 70 year old White male.  preventative Exam         HPI:       García is here today for his annual physical.  He is UTD on colonoscopy, last done 1/2018 and this showed diverticulosis.  Ten year repeat recommended.  He is due for prostate cancer screening.  He is UTD on Pneumovax (1/2019) and Prevnar (10/2017).  He is due for Shingrix, Td and flu.  He is in the middle of his COVID vaccine course!  He is due for routine labs including HTN panel and PSA.        He has made some lifestyle changes including diet and exercise and has lost some weight.  He has actually stopped his         No falls in the past year.  No advanced directive on record.    ROS:     CONSTITUTIONAL:  Negative for fatigue and fever.      EYES:  Negative for blurred vision.      E/N/T:  Positive for nasal congestion.   Negative for diminished hearing or frequent rhinorrhea.      CARDIOVASCULAR:  Negative for chest pain and palpitations.      RESPIRATORY:  Negative for recent cough and dyspnea.      GASTROINTESTINAL:  Negative for abdominal pain, constipation, diarrhea, nausea and vomiting.      GENITOURINARY:  Negative for nocturia and change in urine stream.      MUSCULOSKELETAL:  Negative for arthralgias and myalgias.      NEUROLOGICAL:  Negative for paresthesias and weakness.      PSYCHIATRIC:  Negative for anxiety, depression and sleep disturbance.          Past Medical History / Family History / Social History:         Last Reviewed on 3/03/2021 09:10 AM by Natalie Pierce    Past Medical History:             PAST MEDICAL HISTORY         Hypertension     Allergies         PREVENTIVE HEALTH MAINTENANCE              COLORECTAL CANCER SCREENING: Up to date (colonoscopy q10y; sigmoidoscopy q5y; Cologuard q3y) was last done 2018, Results are in chart; colonoscopy with normal results; diverticulosis     DENTAL CLEANING: was last done  6 months     EYE EXAM: was last done   - [place]     Hepatitis C Medicare Screening: was last done 10/2017     PSA: was last done          PAST MEDICAL HISTORY             CURRENT MEDICAL PROVIDERS:    Dermatologist: Derm -    E/N/T: Amie         Surgical History:         Sinus surgery with debridement of nasal polyps 2017;         Family History:     Father: Hypertension;  stomach     Mother:  at age 78;  Lung Cancer     Brother(s): Prostate Cancer ( treated-cancer free currently )         Social History:     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          Alcohol: Frequency: Daily When he drinks, the average quantity of alcohol is 2 drinks.   He typically consumes beer, wine, and bourbon.      Caffeine:  He admits to consuming caffeine via coffee ( 2 servings per week ) and tea ( 4 servings per day ).          Substance Abuse History:     Last Reviewed on 3/03/2021 09:10 AM by Natalie Pierce    NEGATIVE         Mental Health History:     Last Reviewed on 3/03/2021 09:10 AM by Natalie Pierce    NEGATIVE         Communicable Diseases (eg STDs):     Last Reviewed on 3/03/2021 09:10 AM by Natalie Pierce    Reportable health conditions; NEGATIVE         Current Problems:     Last Reviewed on 2020 02:28 PM by Natalie Pierce    HTN - Essential (primary) hypertension    Allergic rhinitis, unspecified    Diverticulosis of large intestine without perforation or abscess without bleeding    Altered mental status, unspecified    Encounter for screening for malignant neoplasm of prostate     Hypo-osmolality and hyponatremia    Abnormal results of liver function studies    Other specified diseases of liver        Immunizations:     Influenza, high dose seasonal (FLUZONE HIGH-DOSE 5276-0476) 3/3/2020    Prevnar 13 (Pneumococcal PCV 13) 10/16/2017    Fluzone High-Dose pf (>=65 yr) 10/16/2017    Fluzone High-Dose pf (>=65 yr) 1/4/2019    PNEUMOVAX 23 (Pneumococcal PPV23) 1/4/2019        Allergies:     Last Reviewed on 3/03/2021 08:54 AM by Marcia Lopez    benazepriL: cough  (Adverse Reaction)        Current Medications:     Last Reviewed on 3/03/2021 08:55 AM by Marcia Lopez    metoprolol succinate 25 mg oral Tablet, Extended Release 24 hr [TAKE ONE TABLET BY MOUTH EVERY DAY]    saw palmetto  [900mg once daily]    Fish Oil  [500mg daily]    cholecalciferol (vitamin D3) 125 mcg (5,000 unit) oral capsule [once daily]    zinc 50 mg oral tablet [1/2 tablet daily]    multivitamin oral tablet [daily]        Objective:        Vitals:         Current: 3/3/2021 9:01:32 AM    Ht:  6 ft, 0 in;  Wt: 220 lbs;  BMI: 29.8T: 97.9 F (temporal);  BP: 136/82 mm Hg (right arm, sitting);  P: 77 bpm (right arm (BP Cuff), sitting);  sCr: 0.85 mg/dL;  GFR: 89.91        Exams:     PHYSICAL EXAM:     GENERAL: Vitals recorded well developed, well nourished;  well groomed;  no apparent distress;     EYES: extraocular movements intact; conjunctiva and cornea are normal; PERRLA;     RESPIRATORY: normal respiratory rate and pattern with no distress; normal breath sounds with no rales, rhonchi, wheezes or rubs;     CARDIOVASCULAR: normal rate; rhythm is regular;  no systolic murmur; no edema;     GASTROINTESTINAL: nontender; normal bowel sounds;     GENITOURINARY: prostate:  no nodules, tenderness, or enlargement; Chaperone: DECLINES     MUSCULOSKELETAL: normal gait; normal overall tone     NEUROLOGIC: mental status: alert and oriented x 3; reflexes: brachioradialis: 2+; knee jerks: 2+;     PSYCHIATRIC: appropriate affect and  demeanor; normal psychomotor function; normal speech pattern;         Assessment:         Z00.00   Encounter for general adult medical examination without abnormal findings       I10   HTN - Essential (primary) hypertension           ORDERS:         Radiology/Test Orders:       3017F  Colorectal CA screen results documented and reviewed (PV)  (In-House)              Lab Orders:       [id number]  [facility] Comp. Metabolic Panel  (Send-Out)            08409  [facility] Lipid Panel  (Send-Out)            *  [employer]SAS Medicare screening PSA  (Send-Out)    ***PLEASE COPY APRIL BROCK APRN***        APPTO  Appointment need  (In-House)              Other Orders:       1101F  Pt screen for fall risk; document no falls in past year or only 1 fall w/o injury in past year ([place])  (In-House)            1124F  Advance Care Planning discussed and doc in MR; no surrogate named or advance care plan provided  (Send-Out)              Depression screen negative  (In-House)                      Plan:         Encounter for general adult medical examination without abnormal findingsGarcía is here today for his annual physical.  He is UTD on colonoscopy, last done 1/2018 and this showed diverticulosis.  Ten year repeat recommended.  He is due for prostate cancer screening; KATELYNN normal, PSA ordered.  He is UTD on Pneumovax (1/2019) and Prevnar (10/2017).  He is due for Shingrix, Td and flu; deferred.  He is in the middle of his COVID vaccine course!  He is due for routine labs including HTN panel and PSA; ordered.  RTC 1 yr for PEx.    LABORATORY:  Labs ordered to be performed today include Comprehensive metabolic panel, lipid panel, and PSA.  [facility] PHQ-9 Depression Screening: Completed form scanned and in chart; Total Score 0; Negative Depression Screen     FOLLOW-UP: in 1 year:.  annual physical 30 min with Stan          Orders:       16991  COMP - [facility] Comp. Metabolic Panel  (Send-Out)            69993  LP - [facility] Lipid Panel  (Send-Out)             *  PRSAS Medicare screening PSA  (Send-Out)    ***PLEASE COPY APRIL JULIETA APRN***        1101F  Pt screen for fall risk; document no falls in past year or only 1 fall w/o injury in past year ([place])  (In-House)            3017F  Colorectal CA screen results documented and reviewed (PV)  (In-House)            1124F  Advance Care Planning discussed and doc in MR; no surrogate named or advance care plan provided  (Send-Out)            APPTO  Appointment need  (In-House)              Depression screen negative  (In-House)              HTN - Essential (primary) hypertensionNo longer on medications.  Checking labs.            Patient Recommendations:        For  Encounter for general adult medical examination without abnormal findings:                    APPOINTMENT INFORMATION:        Monday Tuesday Wednesday Thursday Friday Saturday Sunday            Time:___________________AM  PM   Date:_____________________             Charge Capture:         Primary Diagnosis:     Z00.00  Encounter for general adult medical examination without abnormal findings           Orders:      46828  Preventive medicine, established patient, age 65+ years  (In-House)            1101F  Pt screen for fall risk; document no falls in past year or only 1 fall w/o injury in past year ([place])  (In-House)            3017F  Colorectal CA screen results documented and reviewed (PV)  (In-House)            APPTO  Appointment need  (In-House)              Depression screen negative  (In-House)              I10  HTN - Essential (primary) hypertension

## 2021-05-18 NOTE — PROGRESS NOTES
Percy Mcdaniel  1950     Office/Outpatient Visit    Visit Date: Tue, Mar 3, 2020 08:27 am    Provider: Natalie Pierce MD (Assistant: Perla Talamantes MA)    Location: [facility]        Electronically signed by Natalie Pierce MD on  03/03/2020 09:22:53 AM                             Subjective:        CC: García is a 69 year old White male.  Patient presents today for physical exam         HPI:       García is here today for his annual physical.  He is UTD on colonoscopy, last done 1/2018 and this showed diverticulosis.  He is due for prostate cancer screening.  He is UTD on Pneumovax (1/2019) and Prevnar (10/2017).  He is due for Shingrix, Td and flu.  He is due for routine labs including HTN panel.        No falls in the past year.  No advanced directive on record.          Encounter for general adult medical examination without abnormal findings details;           PHQ-9 Depression Screening: Completed form scanned and in chart; Total Score 0     ROS:     CONSTITUTIONAL:  Negative for fatigue and fever.      EYES:  Negative for blurred vision.      E/N/T:  Positive for nasal congestion.   Negative for diminished hearing, frequent rhinorrhea or sore throat.      CARDIOVASCULAR:  Negative for chest pain and palpitations.      RESPIRATORY:  Positive for recent cough.   Negative for dyspnea, pleuritic chest pain or frequent wheezing.      GASTROINTESTINAL:  Negative for abdominal pain, constipation, diarrhea, nausea and vomiting.      GENITOURINARY:  Negative for nocturia and change in urine stream.      MUSCULOSKELETAL:  Negative for arthralgias and myalgias.      NEUROLOGICAL:  Negative for paresthesias and weakness.      PSYCHIATRIC:  Negative for anxiety, depression and sleep disturbance.          Past Medical History / Family History / Social History:         Last Reviewed on 3/03/2020 08:49 AM by Natalie Pierce    Past Medical History:             PAST MEDICAL HISTORY         Hypertension      Allergies         PREVENTIVE HEALTH MAINTENANCE             COLORECTAL CANCER SCREENING: Up to date (colonoscopy q10y; sigmoidoscopy q5y; Cologuard q3y) was last done 2018, Results are in chart; colonoscopy with normal results; diverticulosis     DENTAL CLEANING: was last done  6 months     EYE EXAM: was last done   - [place]     Hepatitis C Medicare Screening: was last done 10/2017     PSA: was last done          PAST MEDICAL HISTORY             CURRENT MEDICAL PROVIDERS:    Dermatologist: Derm -    E/N/T: Amie         Surgical History:         Sinus surgery with debridement of nasal polyps 2017;         Family History:     Father: Hypertension;  stomach     Mother:  at age 78;  Lung Cancer     Brother(s): Prostate Cancer ( treated-cancer free currently )         Social History:     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          Alcohol: Frequency: Daily When he drinks, the average quantity of alcohol is 2 drinks.   He typically consumes beer, wine, and bourbon.      Caffeine:  He admits to consuming caffeine via coffee ( 2 servings per week ) and tea ( 4 servings per day ).          Substance Abuse History:     Last Reviewed on 3/03/2020 08:49 AM by Natalie Pierce    NEGATIVE         Mental Health History:     Last Reviewed on 3/03/2020 08:49 AM by Natalie Pierce    NEGATIVE         Communicable Diseases (eg STDs):     Last Reviewed on 3/03/2020 08:49 AM by Natalie Pierce    Reportable health conditions; NEGATIVE         Current Problems:     Last Reviewed on 10/07/2019 02:59 PM by López Mark    Essential hypertension, unspecified    Essential (primary) hypertension    Allergic rhinitis, unspecified    Seasonal allergies    Diverticulosis of large intestine without perforation or abscess  without bleeding    Diverticulosis        Immunizations:     Prevnar 13 (Pneumococcal PCV 13) 10/16/2017    Fluzone High-Dose pf (>=65 yr) 10/16/2017    Fluzone High-Dose pf (>=65 yr) 1/4/2019    PNEUMOVAX 23 (Pneumococcal PPV23) 1/4/2019        Allergies:     Last Reviewed on 10/07/2019 02:59 PM by López Mark    No Known Allergies.        Current Medications:     Last Reviewed on 10/07/2019 02:59 PM by López Mark    Nasacort Allergy 24hr 55mcg/1actuation Nasal Spray [1 spray(s) in each nostril daily  ]    Benazepril HCl 40mg Tablet [Take 1 tablet(s) by mouth daily]    Triamterene/Hydrochlorothiazide 37.5mg/25mg Tablet [one a day]    OTC allergy relief     Benadryl Allergy 25mg Tablet [1 tab po q 6 hr prn]        Objective:        Vitals:         Current: 3/3/2020 8:30:55 AM    Ht:  6 ft, 0 in;  Wt: 234 lbs;  BMI: 31.7T: 98.5 F (oral);  BP: 157/87 mm Hg (left arm, sitting);  P: 74 bpm (left arm (BP Cuff), sitting);  sCr: 0.96 mg/dL;  GFR: 82.82        Repeat:     9:18:21 AM  BP:   159/89mm Hg (left arm, sitting) 9:18:38 AM  P:   74bpm (left arm (BP Cuff), sitting)     Exams:     PHYSICAL EXAM:     GENERAL: Vitals recorded well developed, well nourished;  well groomed;  no apparent distress;     EYES: extraocular movements intact; conjunctiva and cornea are normal; PERRLA;     E/N/T: OROPHARYNX:  normal mucosa, dentition, gingiva, and posterior pharynx;     RESPIRATORY: normal respiratory rate and pattern with no distress; normal breath sounds with no rales, rhonchi, wheezes or rubs;     CARDIOVASCULAR: normal rate; rhythm is regular;  no systolic murmur; no edema;     GASTROINTESTINAL: nontender; normal bowel sounds;     GENITOURINARY: prostate:  no nodules, tenderness, or enlargement; Chaperone: DECLINES     MUSCULOSKELETAL: normal gait; normal overall tone     NEUROLOGIC: mental status: alert and oriented x 3; reflexes: brachioradialis: 2+; knee jerks: 2+;     PSYCHIATRIC: appropriate  affect and demeanor; normal psychomotor function; normal speech pattern;         Assessment:         Z00.00   Encounter for general adult medical examination without abnormal findings       Z23   Encounter for immunization       Z13.31   Encounter for screening for depression       I10   Essential (primary) hypertension           ORDERS:         Meds Prescribed:       [New Rx] losartan 100 mg oral tablet [take 1 tablet (100 mg) by oral route once daily], #90 (ninety) tablets, Refills: 1 (one)         Radiology/Test Orders:       3017F  Colorectal CA screen results documented and reviewed (PV)  (In-House)              Lab Orders:       [id number]  COMP - [facility] Comp. Metabolic Panel  (Send-Out)            80362  [facility] - [facility] Lipid Panel  (Send-Out)            APPTO  Appointment need  (In-House)            *  PRSAS Medicare screening PSA  (Send-Out)              Procedures Ordered:       74841  Immunization administration; one vaccine  (In-House)            06809  Fluzone High Dose  (In-House)              Other Orders:         Depression screen negative  (In-House)            1101F  Pt screen for fall risk; document no falls in past year or only 1 fall w/o injury in past year ([place])  (In-House)            1124F  Advance Care Planning discussed and doc in MR; no surrogate named or advance care plan provided  (Send-Out)                      Plan:         Encounter for general adult medical examination without abnormal findingsGarcía is here today for his annual physical.  He is UTD on colonoscopy, last done 1/2018 and this showed diverticulosis.  He is due for prostate cancer screening; KATELYNN normal, PSA ordered.  He is UTD on Pneumovax (1/2019) and Prevnar (10/2017).  He is due for Shingrix, Td and flu.  High dose flu given today.  Shingrix and Td deferred.  He is due for routine labs including HTN panel; ordered.  RTC 1 yr.    LABORATORY:  Labs ordered to be performed today include Comprehensive metabolic panel, lipid  panel, and PSA.  [facility] PHQ-9 Depression Screening: Completed form scanned and in chart; Total Score 0; Negative Depression Screen     FOLLOW-UP: in 1 year:.  annual physical 30 min with Stan          Orders:       37528  COMP - [facility] Comp. Metabolic Panel  (Send-Out)            44604  [facility] Lipid Panel  (Send-Out)              Depression screen negative  (In-House)            3017F  Colorectal CA screen results documented and reviewed (PV)  (In-House)            APPTO  Appointment need  (In-House)            *  PRSAS Medicare screening PSA  (Send-Out)            1101F  Pt screen for fall risk; document no falls in past year or only 1 fall w/o injury in past year ([place])  (In-House)            1124F  Advance Care Planning discussed and doc in MR; no surrogate named or advance care plan provided  (Send-Out)              Encounter for immunization          Immunizations:       19993  Immunization administration; one vaccine  (In-House)            30261  Fluzone High Dose  (In-House)                Dose (ml): 0.5  Site: left deltoid  Route: intramuscular  Administered by: Marcia Lopez          : [employer]  Lot #: [id number]  Exp: 05/26/2020          ND: [address]-21        Essential (primary) hypertensionHe has been checking BP at home and it has been at goal.  However, he does a have a cough and would like to try coming off his benazepril.  Switching to losartan as below.           Prescriptions:       [New Rx] losartan 100 mg oral tablet [take 1 tablet (100 mg) by oral route once daily], #90 (ninety) tablets, Refills: 1 (one)             Patient Recommendations:        For  Encounter for general adult medical examination without abnormal findings:                    APPOINTMENT INFORMATION:        Monday Tuesday Wednesday Thursday Friday Saturday Sunday            Time:___________________AM  PM   Date:_____________________             Charge Capture:         Primary Diagnosis:      Z00.00  Encounter for general adult medical examination without abnormal findings           Orders:      85544  Preventive medicine, established patient, age 65+ years  (In-House)              Depression screen negative  (In-House)            3017F  Colorectal CA screen results documented and reviewed (PV)  (In-House)            APPTO  Appointment need  (In-House)            1101F  Pt screen for fall risk; document no falls in past year or only 1 fall w/o injury in past year ([place])  (In-House)              Z23  Encounter for immunization           Orders:      92310  Immunization administration; one vaccine  (In-House)            35384  Fluzone High Dose  (In-House)              Z13.31  Encounter for screening for depression     I10  Essential (primary) hypertension

## 2021-05-18 NOTE — PROGRESS NOTES
Percy Mcdaniel 1950     Office/Outpatient Visit    Visit Date: Wed, May 9, 2018 08:52 am    Provider: Aruna Armstrong N.P. (Assistant: Ayanna Martins MA)    Location: [facility]        Electronically signed by Aruna Armstrong N.P. on  05/11/2018 08:21:04 AM                             SUBJECTIVE:        HPI:         Patient to be evaluated for essential hypertension, unspecified.  This was first diagnosed several years ago.  He is not using any nonpharmacologic treatment modalities.  His current cardiac medication regimen includes a diuretic and an ACE inhibitor.      ROS:     CONSTITUTIONAL:  Negative for chills, fatigue, fever and weight change.      CARDIOVASCULAR:  Negative for chest pain and pedal edema.      RESPIRATORY:  Negative for dyspnea and cough.      GASTROINTESTINAL:  Negative for abdominal pain, heartburn, constipation, diarrhea, and stool changes.      MUSCULOSKELETAL:  Negative for arthralgias and back pain.      NEUROLOGICAL:  Negative for headaches.      ALLERGIC/IMMUNOLOGIC:  Positive for seasonal allergies.      PSYCHIATRIC:  Negative for anxiety and depression.          PMH/FMH/SH:     Last Reviewed on 10/16/2017 10:13 AM by Aruna Armstrong    Past Medical History:             PAST MEDICAL HISTORY         Hypertension     Allergies         PREVENTIVE HEALTH MAINTENANCE             COLORECTAL CANCER SCREENING: Up to date (colonoscopy q10y; sigmoidoscopy q5y; Cologuard q3y) was last done 1/22/2018, Results are in chart; colonoscopy with normal results; diverticulosis     DENTAL CLEANING: was last done 2016 6 months     EYE EXAM: was last done 2016     Hepatitis C Medicare Screening: was last done 10/2017     PSA: was last done 2016         PAST MEDICAL HISTORY             CURRENT MEDICAL PROVIDERS:    Dermatologist: Derm -    E/N/T: Amie         Surgical History:         Sinus surgery with debridement of nasal polyps 2017;         Family History:     Father:  Hypertension;  stomach     Mother:  at age 78;  Lung Cancer     Brother(s): Prostate Cancer ( treated-cancer free currently )         Social History:     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          Alcohol: Frequency: Daily When he drinks, the average quantity of alcohol is 2 drinks.   He typically consumes beer, wine, and bourbon.      Caffeine:  He admits to consuming caffeine via coffee ( 2 servings per week ) and tea ( 4 servings per day ).          Substance Abuse History:     Last Reviewed on 10/16/2017 10:13 AM by Aruna Armstrong    NEGATIVE         Mental Health History:     Last Reviewed on 10/16/2017 10:13 AM by Aruna Armstrong    NEGATIVE         Communicable Diseases (eg STDs):     Last Reviewed on 10/16/2017 10:13 AM by Aruna Armstrong    Reportable health conditions; NEGATIVE             Current Problems:     Last Reviewed on 10/16/2017 10:13 AM by Aruna Armstrong    Screening for cholesterol level     Essential hypertension, unspecified     Seasonal allergies         Immunizations:     Prevnar 13 (Pneumococcal PCV 13) 10/16/2017     Fluzone High-Dose pf (>=65 yr) 10/16/2017         Allergies:     Last Reviewed on 10/16/2017 10:13 AM by Aruna Armstrong      No Known Drug Allergies.         Current Medications:     Last Reviewed on 10/16/2017 10:13 AM by Aruna Armstrong    Amlodipine  5mg Tablet 1 tab daily     Benazepril HCl 40mg Tablet Take 1 tablet(s) by mouth daily     Triamterene/Hydrochlorothiazide 37.5mg/25mg Tablet one a day     Nasacort Allergy 24hr 55mcg/1actuation Nasal Spray 1 spray(s) in each nostril daily     Aspirin (ASA) 81mg Tablets, Enteric Coated 1 tab daily     OTC allergy relief         OBJECTIVE:        Vitals:         Current: 2018 8:55:19 AM    Ht:  6 ft, 0 in;  Wt: 241  lbs;  BMI: 32.7    T: 99.5 F (oral);  BP: 146/91 mm Hg (left arm, sitting);  P: 81 bpm (left arm (BP Cuff), sitting);  sCr: 0.86 mg/dL;  GFR: 96.10        Repeat:     8:59:55 AM     BP:   138/88mm Hg        Exams:     PHYSICAL EXAM:     GENERAL: Vitals recorded well developed, well nourished;  well groomed;  no apparent distress;     NECK:  supple, full ROM; no thyromegaly; no carotid bruits;     RESPIRATORY: normal respiratory rate and pattern with no distress; normal breath sounds with no rales, rhonchi, wheezes or rubs;     CARDIOVASCULAR: normal rate; rhythm is regular;  normal S1; normal S2; no systolic murmur; no cyanosis; no edema;     MUSCULOSKELETAL:  Normal range of motion, strength and tone;     NEUROLOGICAL:  cranial nerves, motor and sensory function, reflexes, gait and coordination are all intact;     PSYCHIATRIC:  appropriate affect and demeanor; normal speech pattern; grossly normal memory;         ASSESSMENT:           401.9   I10  Essential hypertension, unspecified              DDx:         ORDERS:         Meds Prescribed:       Refill of: Amlodipine  5mg Tablet 1 tab daily  #30 (Thirty) tablet(s) Refills: 5       Refill of: Benazepril HCl 40mg Tablet Take 1 tablet(s) by mouth daily  #30 (Thirty) tablet(s) Refills: 5       Refill of: Triamterene/Hydrochlorothiazide 37.5mg/25mg Tablet one a day  #30 (Thirty) tablet(s) Refills: 5                 PLAN:          Essential hypertension, unspecified           Prescriptions:       Refill of: Amlodipine  5mg Tablet 1 tab daily  #30 (Thirty) tablet(s) Refills: 5       Refill of: Benazepril HCl 40mg Tablet Take 1 tablet(s) by mouth daily  #30 (Thirty) tablet(s) Refills: 5       Refill of: Triamterene/Hydrochlorothiazide 37.5mg/25mg Tablet one a day  #30 (Thirty) tablet(s) Refills: 5             CHARGE CAPTURE:           Primary Diagnosis:     401.9 Essential hypertension, unspecified            I10    Essential (primary) hypertension              Orders:           17447   Office/outpatient visit; established patient, level 3  (In-House)

## 2021-05-18 NOTE — PROGRESS NOTES
Percy Mcdaniel 1950     Office/Outpatient Visit    Visit Date: Fri, Jan 4, 2019 08:37 am    Provider: Aruna Armstrong N.P. (Assistant: Sarah Spurling, MA)    Location: [facility]        Electronically signed by Aruna Armstrong N.P. on  01/04/2019 01:21:35 PM                             SUBJECTIVE:        CC:     Mr. Mcdaniel is a 68 year old White male.  Prevenative Exam. He would also like his flu shot.          HPI:         Mr. Mcdaniel presents with health checkup.  His last physical exam was Yearly to every 6 months..  He does not perform regular testicular self-exams.      Smoking Status:  Nonsmoker         PHQ-9 Depression Screening: Completed form scanned and in chart; Total Score 0 Alcohol Consumption Screening: Completed form scanned and in chart; Total Score 3     Doing much better since the polypectomy  Using flonase daily     Dx with seasonal allergies; these started years ago.  The allergy pattern seems to be seasonal.  stable on current treatment         Complaint of diverticulosis..  recent colonoscopy shows diverticulosis - never a case of diverticulitis - stable without problems         Concerning essential hypertension, unspecified, he did not bring his blood pressure diary, but says that typical readings show 120/70s.  He is tolerating the medication well without side effects.  Compliance with treatment has been good.  He has recently started using the Priscilla device and feels that his blood pressure has been lower than usua - would like to try to get off of his medication or some of them at leastl     ROS:     CONSTITUTIONAL:  Negative for chills, fatigue, fever, unintentional weight gain and unintentional weight loss.      EYES:  Positive for use of glasses.      E/N/T:  Negative for diminished hearing, nasal congestion and sore throat.      CARDIOVASCULAR:  Positive for pedal edema ( mild; occasional ).   Negative for chest pain or varicosities.      RESPIRATORY:  Negative  for recent cough, dyspnea and frequent wheezing.      GASTROINTESTINAL:  Positive for heartburn ( occasional - controlled with diet ).   Negative for abdominal pain, acid reflux symptoms, constipation, diarrhea, melena, nausea or vomiting.      GENITOURINARY:  Positive for nocturia (couple of times at night).   Negative for dysuria or change in urine stream.      MUSCULOSKELETAL:  Negative for arthralgias, back pain, joint stiffness and myalgias.      INTEGUMENTARY:  Negative for suspicous mole ( no problems  - derm 2 years ago ) and rash.      NEUROLOGICAL:  Negative for dizziness and headaches.      ENDOCRINE:  Negative for hair loss, polydipsia, polyphagia and excessive sweating.      ALLERGIC/IMMUNOLOGIC:  Positive for seasonal allergies.      PSYCHIATRIC:  Negative for anxiety, depression and suicidal thoughts.          PMH/FMH/SH:     Last Reviewed on 2019 08:56 AM by Aruna Armstrong    Past Medical History:             PAST MEDICAL HISTORY         Hypertension     Allergies         PREVENTIVE HEALTH MAINTENANCE             COLORECTAL CANCER SCREENING: Up to date (colonoscopy q10y; sigmoidoscopy q5y; Cologuard q3y) was last done 2018, Results are in chart; colonoscopy with normal results; diverticulosis     DENTAL CLEANING: was last done  6 months     EYE EXAM: was last done   - [facility]     Hepatitis C Medicare Screening: was last done 10/2017     PSA: was last done          PAST MEDICAL HISTORY             CURRENT MEDICAL PROVIDERS:    Dermatologist: Derm -    E/N/T: Amie         Surgical History:         Sinus surgery with debridement of nasal polyps 2017;         Family History:     Father: Hypertension;  stomach     Mother:  at age 78;  Lung Cancer     Brother(s): Prostate Cancer ( treated-cancer free currently )         Social History:     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          Alcohol: Frequency: Daily When he drinks, the average quantity of alcohol is 2 drinks.   He typically consumes beer, wine, and bourbon.      Caffeine:  He admits to consuming caffeine via coffee ( 2 servings per week ) and tea ( 4 servings per day ).          Substance Abuse History:     Last Reviewed on 1/04/2019 08:56 AM by Aruna Armstrong    NEGATIVE         Mental Health History:     Last Reviewed on 1/04/2019 08:56 AM by Aruna Armstrong    NEGATIVE         Communicable Diseases (eg STDs):     Last Reviewed on 1/04/2019 08:56 AM by Aruna Armstrong    Reportable health conditions; NEGATIVE             Current Problems:     Last Reviewed on 1/04/2019 08:56 AM by Aruna Armstrogn    Diverticulosis     Essential hypertension, unspecified     Screening for prostate cancer     Screening for depression     Seasonal allergies         Immunizations:     Prevnar 13 (Pneumococcal PCV 13) 10/16/2017     Fluzone High-Dose pf (>=65 yr) 10/16/2017     Fluzone High-Dose pf (>=65 yr) 1/4/2019     PNEUMOVAX 23 (Pneumococcal PPV23) 1/4/2019         Allergies:     Last Reviewed on 1/04/2019 08:56 AM by Aruna Armstrong      No Known Drug Allergies.         Current Medications:     Last Reviewed on 1/04/2019 08:56 AM by Aruna Armstrong    Amlodipine  5mg Tablet 1 tab daily     Benazepril HCl 40mg Tablet Take 1 tablet(s) by mouth daily     Triamterene/Hydrochlorothiazide 37.5mg/25mg Tablet one a day     Nasacort Allergy 24hr 55mcg/1actuation Nasal Spray 1 spray(s) in each nostril daily     Aspirin (ASA) 81mg Tablets, Enteric Coated 1 tab daily     OTC allergy relief         OBJECTIVE:        Vitals:         Current: 1/4/2019 8:47:58 AM    Ht:  6 ft, 0 in;  Wt: 243 lbs;  BMI: 33.0    T: 98 F (oral);  BP: 128/72 mm Hg (left arm, sitting);  P: 68 bpm (left arm (BP Cuff), sitting);  sCr: 0.86 mg/dL;  GFR: 95.19    VA: 20/20 OD,  20/20 OS (with correction)        Repeat:     8:48:10 AM     VA:    (20/20 OD,  (with correction, , 20/20 OS, , 20/13 both))         Exams:     PHYSICAL EXAM:     GENERAL: Vitals recorded well developed, well nourished;  no apparent distress;     EYES: PERRL, EOMI     E/N/T: EARS: external auditory canal normal bilaterally;  bilateral TMs are normal;  NOSE:  normal nasal mucosa, septum, turbinates, and sinuses; OROPHARYNX:  normal mucosa, dentition, gingiva, and posterior pharynx;     NECK: range of motion is normal;     RESPIRATORY: normal appearance and symmetric expansion of chest wall; normal respiratory rate and pattern with no distress; normal breath sounds with no rales, rhonchi, wheezes or rubs;     CARDIOVASCULAR: normal rate; rhythm is regular;  no edema;     GASTROINTESTINAL: nontender; normal bowel sounds;     LYMPHATIC: no enlargement of cervical or facial nodes; no supraclavicular nodes;     MUSCULOSKELETAL: normal gait; normal range of motion of all major muscle groups; no limb or joint pain with range of motion;     NEUROLOGIC: mental status: alert and oriented x 3;     PSYCHIATRIC: appropriate affect and demeanor; normal speech pattern; normal thought and perception;         Procedures:     Influenza vaccination     1. Influenza high dose 0.5 ml unit dose, SCS given IM in the left upper arm; administered by SCS;  lot number [id number]; expires 6-4-19         Vaccination against streptococcus pneumoniae     1. Pneumovax (pneumococcal PPSV23): 0.5 ml unit dose given IM in the right upper arm; administered by SCS;  lot number [id number]; expires 4-             ASSESSMENT           V70.0   Z00.00  Health checkup              DDx:     V79.0   Z13.89  Screening for depression              DDx:     V04.81   Z23  Influenza vaccination              DDx:     477.0   J30.9  Seasonal allergies              DDx:     562.10   K57.30  Diverticulosis              DDx:     401.9   I10  Essential hypertension,  unspecified              DDx:     V03.82   Z23  Vaccination against streptococcus pneumoniae              DDx:     V76.44   Z12.5  Screening for prostate cancer              DDx:         ORDERS:         Meds Prescribed:       Refill of: Amlodipine  5mg Tablet 1 tab daily  #90 (Ninety) tablet(s) Refills: 1       Refill of: Benazepril HCl 40mg Tablet Take 1 tablet(s) by mouth daily  #90 (Ninety) tablet(s) Refills: 1       Refill of: Triamterene/Hydrochlorothiazide 37.5mg/25mg Tablet one a day  #90 (Ninety) tablet(s) Refills: 1         Radiology/Test Orders:       3017F  Colorectal CA screen results documented and reviewed (PV)  (In-House)           Lab Orders:       [id number]  BDCB2 - [facility] CBC w/o diff  (Send-Out)         96365  COMP [facility] Comp. Metabolic Panel  (Send-Out)         41803  DP - [facility] Lipid Panel  (Send-Out)         *  [employer] Medicare screening PSA  (Send-Out)           Procedures Ordered:       [id number]  Fluzone High Dose  (In-House)         68775  Immunization administration; one vaccine  (In-House)         39182  Pneumococcal polysaccharide vaccine, 23-valent, adult or immunosuppressed patient dosage, for use in  (In-House)         13259  Immunization administration; each additional vaccine/toxoid  (In-House)           Other Orders:         Depression screen negative  (In-House)         1101F  Pt screen for fall risk; document no falls in past year or only 1 fall w/o injury in past year ([place])  (In-House)           Negative EtOH screen  (In-House)           Calculated BMI above the upper parameter and a follow-up plan was documented in the medical record  (In-House)                   PLAN:          Health checkup     LABORATORY:  Labs ordered to be performed today include CBC W/O DIFF, Comprehensive metabolic panel, and lipid panel.  MIPS Has had no falls in the past year Vaccines Flu and Pneumonia updated in Shot record Negative alcohol screen     COLORECTAL CANCER SCREENING: Results are in  chart     BMI Elevated - Follow-Up Plan: He was provided education on weight loss strategies           Orders:       33363  BDCB2 - [facility] CBC w/o diff  (Send-Out)         01388  COMP - [facility] Comp. Metabolic Panel  (Send-Out)         31749  LPDP - [facility] Lipid Panel  (Send-Out)         1101F  Pt screen for fall risk; document no falls in past year or only 1 fall w/o injury in past year ([place])  (In-House)           Negative EtOH screen  (In-House)         3017F  Colorectal CA screen results documented and reviewed (PV)  (In-House)           Calculated BMI above the upper parameter and a follow-up plan was documented in the medical record  (In-House)            Screening for depression     MIPS PHQ-9 Depression Screening Completed form scanned and in chart; Total Score 0           Orders:         Depression screen negative  (In-House)            Influenza vaccination           Orders:       59974  Fluzone High Dose  (In-House)         74954  Immunization administration; one vaccine  (In-House)            Essential hypertension, unspecified We will have Percy try to start reducing his medication - I will have him take 1/2 tab of amlodipine over the next month as see if his BP changes much - if no change, may consider eliminating this medication. He will call with his progress in 2-3 months to see how he is doing           Prescriptions:       Refill of: Amlodipine  5mg Tablet 1 tab daily  #90 (Ninety) tablet(s) Refills: 1       Refill of: Benazepril HCl 40mg Tablet Take 1 tablet(s) by mouth daily  #90 (Ninety) tablet(s) Refills: 1       Refill of: Triamterene/Hydrochlorothiazide 37.5mg/25mg Tablet one a day  #90 (Ninety) tablet(s) Refills: 1          Vaccination against streptococcus pneumoniae         IMMUNIZATIONS given today: Pneumovax.            Orders:       25378  Pneumococcal polysaccharide vaccine, 23-valent, adult or immunosuppressed patient dosage, for use in  (In-House)         83270  Immunization  administration; each additional vaccine/toxoid  (In-House)            Screening for prostate cancer     LABORATORY:  Labs ordered to be performed today include PSA.            Orders:       *  [employer] Medicare screening PSA  (Send-Out)               CHARGE CAPTURE           **Please note: ICD descriptions below are intended for billing purposes only and may not represent clinical diagnoses**        Primary Diagnosis:         V70.0 Health checkup            Z00.00    Encounter for general adult medical examination without abnormal findings              Orders:          59408   Preventive medicine, established patient, age 65+ years  (In-House)             1101F   Pt screen for fall risk; document no falls in past year or only 1 fall w/o injury in past year ([place])  (In-House)                Negative EtOH screen  (In-House)             3017F   Colorectal CA screen results documented and reviewed (PV)  (In-House)                Calculated BMI above the upper parameter and a follow-up plan was documented in the medical record  (In-House)           V79.0 Screening for depression            Z13.89    Encounter for screening for other disorder              Orders:          98903 -25  Office/outpatient visit; established patient, level 4  (In-House)                Depression screen negative  (In-House)           V04.81 Influenza vaccination            Z23    Encounter for immunization              Orders:          11057   Fluzone High Dose  (In-House)             17412   Immunization administration; one vaccine  (In-House)           477.0 Seasonal allergies            J30.9    Allergic rhinitis, unspecified    562.10 Diverticulosis            K57.30    Diverticulosis of large intestine without perforation or abscess without bleeding    401.9 Essential hypertension, unspecified            I10    Essential (primary) hypertension    V03.82 Vaccination against streptococcus pneumoniae            Z23    Encounter  for immunization              Orders:          00790   Pneumococcal polysaccharide vaccine, 23-valent, adult or immunosuppressed patient dosage, for use in  (In-House)             92381   Immunization administration; each additional vaccine/toxoid  (In-House)           V76.44 Screening for prostate cancer            Z12.5    Encounter for screening for malignant neoplasm of prostate

## 2021-05-18 NOTE — PROGRESS NOTES
Percy Mcdaniel KAI  1950     Office/Outpatient Visit    Visit Date: Fri, May 15, 2020 08:56 am    Provider: Natalie Pierce MD (Assistant: Marcia Lopez RN)    Location: [facility]        Electronically signed by Natalie Pierce MD on  05/15/2020 09:36:19 AM                             Subjective:        CC: García is a 69 year old White male.  discuss bp medications---doximity--[phone] PT IS NOT TAKING BENAZEPRIL        HPI: García's telehealth visit today is to discuss his HTN.  He is currently on losartan and triamterene-HCTZ.  Has been on benazepril in the past but had cough.  The cough did resolve when he stopped the ACEI.  He has been checking BP at home and has been averaging in the 120s/70s, ranging from 116/78 to 143/78.  He has had some insomnia since starting the losartan and would like to try going down to the 50 mg dose.    ROS:     CONSTITUTIONAL:  Negative for fatigue and fever.      EYES:  Negative for blurred vision.      E/N/T:  Positive for nasal congestion.   Negative for diminished hearing, frequent rhinorrhea or sore throat.      CARDIOVASCULAR:  Negative for chest pain and palpitations.      RESPIRATORY:  Negative for recent cough, dyspnea, pleuritic chest pain and frequent wheezing.      MUSCULOSKELETAL:  Negative for arthralgias and myalgias.          Past Medical History / Family History / Social History:         Last Reviewed on 5/15/2020 09:14 AM by Natalie Pierce    Past Medical History:             PAST MEDICAL HISTORY         Hypertension     Allergies         PREVENTIVE HEALTH MAINTENANCE             COLORECTAL CANCER SCREENING: Up to date (colonoscopy q10y; sigmoidoscopy q5y; Cologuard q3y) was last done 1/22/2018, Results are in chart; colonoscopy with normal results; diverticulosis     DENTAL CLEANING: was last done 2018 6 months     EYE EXAM: was last done 2018  - [facility]     Hepatitis C Medicare Screening: was last done 10/2017     PSA: was last done 2016          PAST MEDICAL HISTORY             CURRENT MEDICAL PROVIDERS:    Dermatologist: Derm -    E/N/T: Amie         Surgical History:         Sinus surgery with debridement of nasal polyps 2017;         Family History:     Father: Hypertension;  stomach     Mother:  at age 78;  Lung Cancer     Brother(s): Prostate Cancer ( treated-cancer free currently )         Social History:     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          Alcohol: Frequency: Daily When he drinks, the average quantity of alcohol is 2 drinks.   He typically consumes beer, wine, and bourbon.      Caffeine:  He admits to consuming caffeine via coffee ( 2 servings per week ) and tea ( 4 servings per day ).          Substance Abuse History:     Last Reviewed on 5/15/2020 09:14 AM by Natalie Pierce    NEGATIVE         Mental Health History:     Last Reviewed on 5/15/2020 09:14 AM by Natalie Pierce    NEGATIVE         Communicable Diseases (eg STDs):     Last Reviewed on 5/15/2020 09:14 AM by Natalie Pierce    Reportable health conditions; NEGATIVE         Current Problems:     Last Reviewed on 3/03/2020 08:49 AM by aNtalie Pierce    Essential (primary) hypertension    Allergic rhinitis, unspecified    Diverticulosis of large intestine without perforation or abscess without bleeding        Immunizations:     Influenza, high dose seasonal (FLUZONE HIGH-DOSE 0626-1972) 3/3/2020    Prevnar 13 (Pneumococcal PCV 13) 10/16/2017    Fluzone High-Dose pf (>=65 yr) 10/16/2017    Fluzone High-Dose pf (>=65 yr) 2019    PNEUMOVAX 23 (Pneumococcal PPV23) 2019        Allergies:     Last Reviewed on 5/15/2020 08:56 AM by Marcia Lopez    benazepriL:   (Adverse Reaction)        Current Medications:     Last Reviewed on 5/15/2020 08:56 AM by Marcia Lopez     Nasacort Allergy 24hr 55mcg/1actuation Nasal Spray [1 spray(s) in each nostril daily  ]    benazepriL 40 mg oral tablet [TAKE 1 TABLET BY MOUTH EVERY DAY]    triamterene-hydrochlorothiazid 37.5-25 mg oral tablet [TAKE 1 TABLET BY MOUTH EVERY DAY]    OTC allergy relief      Benadryl Allergy 25 mg oral tablet [1 tab po q 6 hr prn]    losartan 100 mg oral tablet [take 1 tablet (100 mg) by oral route once daily]        Objective:        Vitals:         Current: 5/15/2020 9:23:32 AM    Ht:  6 ft, 0 in;  Wt: 215 lbs;  BMI: 29.2T: 96.9 F (oral);  BP: 130/81 mm Hg (left arm, sitting);  sCr: 0.96 mg/dL;  GFR: 79.90        Exams:     PHYSICAL EXAM:     GENERAL: well developed, well nourished;  well groomed;  no apparent distress;     EYES: extraocular movements intact; conjunctiva and cornea are normal;     RESPIRATORY: normal respiratory rate and pattern with no distress;     MUSCULOSKELETAL: normal overall tone     NEUROLOGIC: mental status: alert and oriented x 3;     PSYCHIATRIC: appropriate affect and demeanor; normal psychomotor function; normal speech pattern;         Assessment:         I10   HTN - Essential (primary) hypertension           ORDERS:         Meds Prescribed:       [Refilled] losartan 50 mg oral tablet [take 1 tablet (50 mg) by oral route once daily], #90 (ninety) tablets, Refills: 1 (one)         Radiology/Test Orders:       3017F  Colorectal CA screen results documented and reviewed (PV)  (In-House)              Other Orders:       1124F  Advance Care Planning discussed and doc in MR; no surrogate named or advance care plan provided  (Send-Out)                      Plan:         HTN - Essential (primary) hypertensionLosartan seems to be doing an excellent job replacing the benazepril, although he feels it might be giving him some insomnia.  He would like to try going to a 50 mg dose.  We discussed that he could also try going to 50 mg BID and see if that is preferable, if he still needs the 100 mg  total to achieve good BP control.  He will experiment a bit between 50 mg once daily and BID and see what works best to control the BP and decrease the adverse effects.  He will let me know.  RTC as scheduled.    MIPS Vaccines Flu and Pneumonia updated in Shot record Colorectal Cancer Screening is up to date and the results are in the chart Advance Directive/Surrogate Decision Maker discussed and pt declines to complete today Telehealth: Verbal consent obtained for visit to occur via televideo conferencing; Staff, other than provider, present during telephone visit include Marcia Lopez RN     FOLLOW-UP: Keep currently scheduled appointment.:.            Prescriptions:       [Refilled] losartan 50 mg oral tablet [take 1 tablet (50 mg) by oral route once daily], #90 (ninety) tablets, Refills: 1 (one)           Orders:       3017F  Colorectal CA screen results documented and reviewed (PV)  (In-House)            1124F  Advance Care Planning discussed and doc in MR; no surrogate named or advance care plan provided  (Send-Out)                  Patient Recommendations:        For  HTN - Essential (primary) hypertension:                    APPOINTMENT INFORMATION:        Monday Tuesday Wednesday Thursday Friday Saturday Sunday            Time:___________________AM  PM   Date:_____________________             Charge Capture:         Primary Diagnosis:     I10  HTN - Essential (primary) hypertension           Orders:      12747  Office/outpatient visit; established patient, level 3  (In-House)            3017F  Colorectal CA screen results documented and reviewed (PV)  (In-House)

## 2021-07-01 VITALS
WEIGHT: 239.2 LBS | HEART RATE: 75 BPM | HEIGHT: 72 IN | TEMPERATURE: 97.4 F | SYSTOLIC BLOOD PRESSURE: 143 MMHG | DIASTOLIC BLOOD PRESSURE: 85 MMHG | BODY MASS INDEX: 32.4 KG/M2

## 2021-07-01 VITALS
SYSTOLIC BLOOD PRESSURE: 131 MMHG | WEIGHT: 239 LBS | TEMPERATURE: 98.1 F | BODY MASS INDEX: 32.37 KG/M2 | DIASTOLIC BLOOD PRESSURE: 80 MMHG | HEIGHT: 72 IN | HEART RATE: 92 BPM

## 2021-07-01 VITALS
BODY MASS INDEX: 32.91 KG/M2 | DIASTOLIC BLOOD PRESSURE: 72 MMHG | SYSTOLIC BLOOD PRESSURE: 128 MMHG | TEMPERATURE: 98 F | HEIGHT: 72 IN | HEART RATE: 68 BPM | WEIGHT: 243 LBS

## 2021-07-01 VITALS
TEMPERATURE: 99.5 F | SYSTOLIC BLOOD PRESSURE: 138 MMHG | WEIGHT: 241 LBS | DIASTOLIC BLOOD PRESSURE: 88 MMHG | HEIGHT: 72 IN | BODY MASS INDEX: 32.64 KG/M2 | HEART RATE: 81 BPM

## 2021-07-02 VITALS
HEART RATE: 89 BPM | TEMPERATURE: 96.9 F | DIASTOLIC BLOOD PRESSURE: 91 MMHG | BODY MASS INDEX: 27.58 KG/M2 | SYSTOLIC BLOOD PRESSURE: 156 MMHG | WEIGHT: 203.6 LBS | HEIGHT: 72 IN

## 2021-07-02 VITALS
BODY MASS INDEX: 31.69 KG/M2 | WEIGHT: 234 LBS | SYSTOLIC BLOOD PRESSURE: 159 MMHG | TEMPERATURE: 98.5 F | HEART RATE: 74 BPM | DIASTOLIC BLOOD PRESSURE: 89 MMHG | HEIGHT: 72 IN

## 2021-07-02 VITALS
WEIGHT: 215 LBS | SYSTOLIC BLOOD PRESSURE: 130 MMHG | HEIGHT: 72 IN | DIASTOLIC BLOOD PRESSURE: 81 MMHG | TEMPERATURE: 96.9 F | BODY MASS INDEX: 29.12 KG/M2

## 2021-07-02 VITALS
DIASTOLIC BLOOD PRESSURE: 82 MMHG | BODY MASS INDEX: 29.8 KG/M2 | WEIGHT: 220 LBS | TEMPERATURE: 97.9 F | HEIGHT: 72 IN | HEART RATE: 77 BPM | SYSTOLIC BLOOD PRESSURE: 136 MMHG

## 2022-03-09 ENCOUNTER — OFFICE VISIT (OUTPATIENT)
Dept: FAMILY MEDICINE CLINIC | Age: 72
End: 2022-03-09

## 2022-03-09 VITALS
HEART RATE: 80 BPM | SYSTOLIC BLOOD PRESSURE: 176 MMHG | BODY MASS INDEX: 32.51 KG/M2 | DIASTOLIC BLOOD PRESSURE: 112 MMHG | WEIGHT: 240 LBS | TEMPERATURE: 98.5 F | HEIGHT: 72 IN

## 2022-03-09 DIAGNOSIS — Z12.5 PROSTATE CANCER SCREENING: ICD-10-CM

## 2022-03-09 DIAGNOSIS — I10 PRIMARY HYPERTENSION: ICD-10-CM

## 2022-03-09 DIAGNOSIS — Z00.00 ANNUAL PHYSICAL EXAM: Primary | ICD-10-CM

## 2022-03-09 PROBLEM — J30.9 ALLERGIC RHINITIS: Status: ACTIVE | Noted: 2022-03-09

## 2022-03-09 PROCEDURE — 99397 PER PM REEVAL EST PAT 65+ YR: CPT | Performed by: FAMILY MEDICINE

## 2022-03-09 NOTE — ASSESSMENT & PLAN NOTE
He is UTD on colonoscopy, last done 1/2018 and this showed diverticulosis.  Ten year repeat recommended.  He is due for prostate cancer screening; KATELYNN normal, PSA ordered.  He is UTD on COVID (Pfizer x2, due for shot #3), Pneumovax (1/2019) and Prevnar (10/2017) and Td (7/2020).  He is due for Shingrix and flu.  Flu declined.  [facility]  He is due for routine labs including HTN panel and PSA; ordered.

## 2022-03-09 NOTE — PROGRESS NOTES
"Chief Complaint  Annual Exam    Subjective          Percy Mcdaniel presents to [facility] today for his annual physical.  He is UTD on colonoscopy, last done 1/2018 and this showed diverticulosis.  Ten year repeat recommended.  He is due for prostate cancer screening.  He is UTD on COVID (Pfizer x2, due for shot #3), Pneumovax (1/2019) and Prevnar (10/2017) and Td (7/2020).  He is due for Shingrix and flu.  He is due for routine labs including HTN panel and PSA.    He has white coat HTN.  He does check at home regularly 128-140/74-84.    Review of Systems   Constitutional: Negative for chills, fatigue and fever.   HENT: Negative for congestion, hearing loss and rhinorrhea.    Eyes: Negative for pain and visual disturbance.   Respiratory: Negative for cough and shortness of breath.    Cardiovascular: Negative for chest pain and palpitations.   Gastrointestinal: Negative for abdominal pain, constipation, diarrhea, nausea and vomiting.   Genitourinary: Negative for dysuria and hematuria.   Musculoskeletal: Negative for arthralgias and myalgias.   Skin: Negative for rash.   Neurological: Negative for weakness and numbness.   Psychiatric/Behavioral: Negative for dysphoric mood and sleep disturbance. The patient is not nervous/anxious.        No current outpatient medications on file.    Allergies:  Patient has no known allergies.      Objective   Vital Signs:   Vitals:    03/09/22 0835 03/09/22 0837   BP: (!) 172/101 172/95   BP Location: Left arm Right arm   Patient Position: Sitting Sitting   Pulse: 83 84   Temp: 98.5 °F (36.9 °C)    TempSrc: Oral    Weight: 109 kg (240 lb)    Height: 182.9 cm (72.01\")      Physical Exam  Vitals reviewed.   Constitutional:       General: He is not in acute distress.     Appearance: Normal appearance. He is well-developed. He is not diaphoretic.   HENT:      Head: Normocephalic and atraumatic. Hair is normal.      Right Ear: Hearing and external ear " normal.      Left Ear: Hearing and external ear normal.      Nose: No nasal deformity.      Mouth/Throat:      Mouth: Mucous membranes are moist. No oral lesions.      Pharynx: Uvula midline. No uvula swelling.   Eyes:      General: Lids are normal.      Extraocular Movements: Extraocular movements intact.      Right eye: Normal extraocular motion and no nystagmus.      Left eye: Normal extraocular motion and no nystagmus.      Conjunctiva/sclera: Conjunctivae normal.      Pupils: Pupils are equal, round, and reactive to light.   Cardiovascular:      Rate and Rhythm: Normal rate and regular rhythm.      Pulses: Normal pulses.      Heart sounds: Normal heart sounds. No murmur heard.  Pulmonary:      Effort: Pulmonary effort is normal.      Breath sounds: Normal breath sounds. No wheezing, rhonchi or rales.   Abdominal:      General: Bowel sounds are normal. There is no distension.      Palpations: Abdomen is soft.      Tenderness: There is no abdominal tenderness.   Genitourinary:     Prostate: Normal.      Comments: Chaperon: declined  Musculoskeletal:         General: Normal range of motion.      Cervical back: Normal range of motion.   Skin:     General: Skin is warm and dry.   Neurological:      Mental Status: He is alert and oriented to person, place, and time.      Deep Tendon Reflexes: Reflexes are normal and symmetric. Reflexes normal.   Psychiatric:         Mood and Affect: Mood and affect normal.         Behavior: Behavior normal.         Thought Content: Thought content normal.         Judgment: Judgment normal.             Assessment and Plan    Diagnoses and all orders for this visit:    1. Annual physical exam (Primary)  Assessment & Plan:  He is UTD on colonoscopy, last done 1/2018 and this showed diverticulosis.  Ten year repeat recommended.  He is due for prostate cancer screening; KATELYNN normal, PSA ordered.  He is UTD on COVID (Pfizer x2, due for shot #3), Pneumovax (1/2019) and Prevnar (10/2017) and  Td (7/2020).  He is due for Shingrix and flu.  Flu declined.  [facility].  He is due for routine labs including HTN panel and PSA; ordered.       2. Primary hypertension  Assessment & Plan:  +White coat hypertension.  He has been monitoring at home and his blood pressure has been running at goal.  Not starting antihypertensives today, but I will have him monitor his blood pressure at home and follow these values.  If he is consistently running above 140/90, he will let me know and we will consider starting medication.  Checking labs today.    Orders:  -     Comprehensive Metabolic Panel; Future  -     Lipid Panel; Future    3. Prostate cancer screening  -     PSA Screen; Future      Follow Up   Return in about 1 year (around 3/9/2023) for Annual physical.  Patient was given instructions and counseling regarding his condition or for health maintenance advice. Please see specific information pulled into the AVS if appropriate.

## 2022-03-09 NOTE — ASSESSMENT & PLAN NOTE
+White coat hypertension.  He has been monitoring at home and his blood pressure has been running at goal.  Not starting antihypertensives today, but I will have him monitor his blood pressure at home and follow these values.  If he is consistently running above 140/90, he will let me know and we will consider starting medication.  Checking labs today.

## 2022-03-10 LAB
ALBUMIN SERPL-MCNC: 4.3 G/DL (ref 3.8–4.8)
ALBUMIN/GLOB SERPL: 1.6 {RATIO} (ref 1.2–2.2)
ALP SERPL-CCNC: 80 IU/L (ref 44–121)
ALT SERPL-CCNC: 22 IU/L (ref 0–44)
AMBIG ABBREV CMP14 DEFAULT: NORMAL
AMBIG ABBREV LP DEFAULT: NORMAL
AST SERPL-CCNC: 18 IU/L (ref 0–40)
BILIRUB SERPL-MCNC: 0.8 MG/DL (ref 0–1.2)
BUN SERPL-MCNC: 19 MG/DL (ref 8–27)
BUN/CREAT SERPL: 19 (ref 10–24)
CALCIUM SERPL-MCNC: 9.2 MG/DL (ref 8.6–10.2)
CHLORIDE SERPL-SCNC: 100 MMOL/L (ref 96–106)
CHOLEST SERPL-MCNC: 213 MG/DL (ref 100–199)
CO2 SERPL-SCNC: 29 MMOL/L (ref 20–29)
CREAT SERPL-MCNC: 0.99 MG/DL (ref 0.76–1.27)
EGFR GENE MUT ANL BLD/T: 82 ML/MIN/1.73
GLOBULIN SER CALC-MCNC: 2.7 G/DL (ref 1.5–4.5)
GLUCOSE SERPL-MCNC: 88 MG/DL (ref 65–99)
HDLC SERPL-MCNC: 55 MG/DL
LDLC SERPL CALC-MCNC: 138 MG/DL (ref 0–99)
POTASSIUM SERPL-SCNC: 4.2 MMOL/L (ref 3.5–5.2)
PROT SERPL-MCNC: 7 G/DL (ref 6–8.5)
PSA SERPL-MCNC: 2 NG/ML (ref 0–4)
SODIUM SERPL-SCNC: 143 MMOL/L (ref 134–144)
TRIGL SERPL-MCNC: 111 MG/DL (ref 0–149)
VLDLC SERPL CALC-MCNC: 20 MG/DL (ref 5–40)

## 2022-11-16 ENCOUNTER — OFFICE VISIT (OUTPATIENT)
Dept: FAMILY MEDICINE CLINIC | Age: 72
End: 2022-11-16

## 2022-11-16 VITALS
BODY MASS INDEX: 32.26 KG/M2 | DIASTOLIC BLOOD PRESSURE: 92 MMHG | SYSTOLIC BLOOD PRESSURE: 200 MMHG | TEMPERATURE: 98 F | WEIGHT: 238.2 LBS | OXYGEN SATURATION: 97 % | HEART RATE: 81 BPM | HEIGHT: 72 IN

## 2022-11-16 DIAGNOSIS — I10 PRIMARY HYPERTENSION: Primary | ICD-10-CM

## 2022-11-16 PROCEDURE — 99214 OFFICE O/P EST MOD 30 MIN: CPT | Performed by: PHYSICIAN ASSISTANT

## 2022-11-16 RX ORDER — AMLODIPINE BESYLATE 10 MG/1
10 TABLET ORAL DAILY
Qty: 30 TABLET | Refills: 0 | Status: SHIPPED | OUTPATIENT
Start: 2022-11-16 | End: 2022-11-29 | Stop reason: SDUPTHER

## 2022-11-16 NOTE — PROGRESS NOTES
"Subjective     CHIEF COMPLAINT    Chief Complaint   Patient presents with   • Hypertension     Ongoing since yesterday when pt went to dentist for a teeth cleaning and couldn't due to his BP being too elevated.     Pt states only sx he notices is being more fatigued.             History of Present Illness  This is a 72-year-old male with past medical history of primary hypertension previously controlled with multiple medications presenting to the clinic after noticing elevated blood pressure at the dentist office yesterday.  He has continue to monitor this throughout the evening and this morning and has continued to see readings as high as 200s/100s.  He states he feels some fatigue and \"a slight headache that he would not take any medication for,\" but denies any other symptoms.  He states he stopped taking all blood pressure medications \"during COVID\" because he was found to have low sodium.  It has been fairly well controlled at home although he has had some elevated readings in the office due to suspected whitecoat syndrome.            Review of Systems   Constitutional: Positive for fatigue (since trip last week).   Eyes: Negative for visual disturbance.   Respiratory: Negative for chest tightness and shortness of breath.    Cardiovascular: Negative for chest pain and palpitations.   Gastrointestinal: Negative for abdominal pain.   Neurological: Positive for headaches (\"slight\"). Negative for dizziness, facial asymmetry, weakness and numbness.            History reviewed. No pertinent past medical history.         History reviewed. No pertinent surgical history.         History reviewed. No pertinent family history.         Social History     Socioeconomic History   • Marital status:    Tobacco Use   • Smoking status: Former     Packs/day: 0.50     Years: 1.00     Pack years: 0.50     Types: Cigarettes   • Smokeless tobacco: Never            No Known Allergies         No current outpatient medications on " "file prior to visit.     No current facility-administered medications on file prior to visit.            BP (!) 200/92 (BP Location: Left arm, Patient Position: Sitting, Cuff Size: Small Adult) Comment: manual BP  Pulse 81   Temp 98 °F (36.7 °C) (Oral)   Ht 182.9 cm (72.01\")   Wt 108 kg (238 lb 3.2 oz)   SpO2 97% Comment: room air  BMI 32.30 kg/m²          Objective     Physical Exam  Vitals and nursing note reviewed.   Constitutional:       General: He is not in acute distress.     Appearance: Normal appearance.   Cardiovascular:      Rate and Rhythm: Normal rate and regular rhythm.      Heart sounds: Normal heart sounds.   Pulmonary:      Effort: Pulmonary effort is normal. No respiratory distress.      Breath sounds: Normal breath sounds.   Skin:     General: Skin is warm and dry.      Findings: No rash.   Neurological:      General: No focal deficit present.      Mental Status: He is alert and oriented to person, place, and time.      GCS: GCS eye subscore is 4. GCS verbal subscore is 5. GCS motor subscore is 6.      Cranial Nerves: Cranial nerves 2-12 are intact.      Sensory: Sensation is intact.      Motor: Motor function is intact.      Coordination: Coordination is intact. Romberg sign negative. Finger-Nose-Finger Test normal.      Gait: Gait is intact.   Psychiatric:         Mood and Affect: Mood normal.         Behavior: Behavior normal.              Procedures                    Lab Results (last 24 hours)     ** No results found for the last 24 hours. **                No Radiology Exams Resulted Within Past 24 Hours               Discussed patient with Dr. Pierce, patient's PCP who advised on therapy.  Since he is asymptomatic in the office and has not had any symptoms at home, he does not warrant ER evaluation at this time.  He should be seen back in the office in 2 weeks for recheck and lab work.  Unfortunately, he is going out of town in 1 week and will not be back until after [place].  We " scheduled an appointment for the day before he leaves for this follow-up because he did not want to wait until the end of December which I agree with.  I reviewed concerning symptoms surrounding high blood pressure with García and his wife, including but not limited to severe headache, dizziness, strokelike symptoms, shortness of breath, and chest pain.  They were instructed to proceed directly to the ER should he develop any of the symptoms.    Diagnoses and all orders for this visit:    1. Primary hypertension (Primary)  -     amLODIPine (NORVASC) 10 MG tablet; Take 1 tablet by mouth Daily for 30 days.  Dispense: 30 tablet; Refill: 0                           FOR FULL DISCHARGE INSTRUCTIONS/COMMENTS/HANDOUTS please see the   AVS

## 2022-11-17 ENCOUNTER — TELEPHONE (OUTPATIENT)
Dept: FAMILY MEDICINE CLINIC | Age: 72
End: 2022-11-17

## 2022-11-17 NOTE — TELEPHONE ENCOUNTER
García's wife Adrienne was in today for her appointment and reports that he has had significant improvement with the blood pressure already with going back on 1 days worth of the blood pressure medication (amlodipine 10 mg daily).  His blood pressure yesterday was running in the 150s/90s.  He does require a note to the dentist and that he is cleared to return for his required dental work.  Letter sent to queue and can be picked up by patient or his wife.  Thanks, MYRON

## 2022-11-29 ENCOUNTER — OFFICE VISIT (OUTPATIENT)
Dept: FAMILY MEDICINE CLINIC | Age: 72
End: 2022-11-29

## 2022-11-29 VITALS
SYSTOLIC BLOOD PRESSURE: 162 MMHG | WEIGHT: 237 LBS | DIASTOLIC BLOOD PRESSURE: 100 MMHG | HEART RATE: 98 BPM | HEIGHT: 72 IN | BODY MASS INDEX: 32.1 KG/M2

## 2022-11-29 DIAGNOSIS — I10 PRIMARY HYPERTENSION: Primary | ICD-10-CM

## 2022-11-29 PROCEDURE — 99214 OFFICE O/P EST MOD 30 MIN: CPT | Performed by: FAMILY MEDICINE

## 2022-11-29 RX ORDER — AMLODIPINE BESYLATE 10 MG/1
10 TABLET ORAL DAILY
Qty: 90 TABLET | Refills: 1 | Status: SHIPPED | OUTPATIENT
Start: 2022-11-29 | End: 2022-12-29

## 2022-11-29 RX ORDER — LISINOPRIL 10 MG/1
10 TABLET ORAL DAILY
Qty: 30 TABLET | Refills: 5 | Status: SHIPPED | OUTPATIENT
Start: 2022-11-29 | End: 2023-03-29 | Stop reason: SINTOL

## 2022-11-29 NOTE — ASSESSMENT & PLAN NOTE
He was started on amlodipine at 10 mg daily two weeks ago and we are definitely seeing a response.  However, he is far from being at goal at this point.  Continue amlodipine 10 mg daily.  Refills sent today.  Tolerating the medication well with no adverse effects.  Additionally, we are going to add in lisinopril 10 mg daily.  Continue to check blood pressure at home and record these values, bring log to next visit.  I will plan to see him back in a couple of months after the holidays.

## 2022-11-29 NOTE — PROGRESS NOTES
"Chief Complaint  Hypertension (Elevated BP )    Subjective     {Problem List  Visit Diagnosis   Encounters  Notes  Medications  Labs  Result Review Imaging  Media :23}     Percy Mcdaniel presents to [facility] today for f/u of routine issues.    He is on amlodipine for hypertension.  Blood pressure is significantly elevated.  He was having elevated BP to 200s/100s earlier this month, at which time the amlodipine was initiated.  He has been on it 2 weeks now.  He has been running 150s/90s.  No chest pain, palpitations, SOB, headaches, lightheadedness, pedal edema, change in edema, tinnitus.  He initially contacted clinic after he had an elevated BP at the dentist office and had to reschedule a cleaning.    He is leaving for [place] for a month tomorrow.      Current Outpatient Medications:   •  amLODIPine (NORVASC) 10 MG tablet, Take 1 tablet by mouth Daily for 30 days., Disp: 90 tablet, Rfl: 1  •  lisinopril (PRINIVIL,ZESTRIL) 10 MG tablet, Take 1 tablet by mouth Daily., Disp: 30 tablet, Rfl: 5    Allergies:  Patient has no known allergies.      Objective   Vital Signs:   Vitals:    11/29/22 1337 11/29/22 1413   BP: 172/100 162/100   BP Location: Left arm Left arm   Patient Position: Sitting Sitting   Pulse: 103 98   Weight: 108 kg (237 lb)    Height: 182.9 cm (72.01\")        Physical Exam  Vitals reviewed.   Constitutional:       General: He is not in acute distress.     Appearance: Normal appearance. He is well-developed.   HENT:      Head: Normocephalic and atraumatic.      Right Ear: External ear normal.      Left Ear: External ear normal.   Eyes:      Extraocular Movements: Extraocular movements intact.      Conjunctiva/sclera: Conjunctivae normal.      Pupils: Pupils are equal, round, and reactive to light.   Cardiovascular:      Rate and Rhythm: Normal rate and regular rhythm.      Heart sounds: No murmur heard.  Pulmonary:      Effort: Pulmonary effort is normal.      " Breath sounds: Normal breath sounds. No wheezing, rhonchi or rales.   Abdominal:      General: Bowel sounds are normal. There is no distension.      Palpations: Abdomen is soft.      Tenderness: There is no abdominal tenderness.   Musculoskeletal:         General: Normal range of motion.   Neurological:      Mental Status: He is alert.   Psychiatric:         Mood and Affect: Affect normal.          Lab Results   Component Value Date    GLUCOSE 88 03/09/2022    BUN 19 03/09/2022    CREATININE 0.99 03/09/2022    EGFRIFNONA 85 03/18/2021    EGFRIFAFRI 98 03/18/2021    BCR 19 03/09/2022    K 4.2 03/09/2022    CO2 29 03/09/2022    CALCIUM 9.2 03/09/2022    PROTENTOTREF 7.0 03/09/2022    ALBUMIN 4.3 03/09/2022    LABIL2 1.6 03/09/2022    AST 18 03/09/2022    ALT 22 03/09/2022       Lab Results   Component Value Date    CHLPL 213 (H) 03/09/2022    TRIG 111 03/09/2022    HDL 55 03/09/2022     (H) 03/09/2022            Assessment and Plan    Diagnoses and all orders for this visit:    1. Primary hypertension (Primary)  Assessment & Plan:  He was started on amlodipine at 10 mg daily two weeks ago and we are definitely seeing a response.  However, he is far from being at goal at this point.  Continue amlodipine 10 mg daily.  Refills sent today.  Tolerating the medication well with no adverse effects.  Additionally, we are going to add in lisinopril 10 mg daily.  Continue to check blood pressure at home and record these values, bring log to next visit.  I will plan to see him back in a couple of months after the holidays.    Orders:  -     lisinopril (PRINIVIL,ZESTRIL) 10 MG tablet; Take 1 tablet by mouth Daily.  Dispense: 30 tablet; Refill: 5  -     amLODIPine (NORVASC) 10 MG tablet; Take 1 tablet by mouth Daily for 30 days.  Dispense: 90 tablet; Refill: 1      Follow Up   Return in about 2 months (around 1/29/2023) for Recheck.  Patient was given instructions and counseling regarding his condition or for health  maintenance advice. Please see specific information pulled into the AVS if appropriate.           11/29/2022

## 2023-03-29 ENCOUNTER — LAB (OUTPATIENT)
Dept: LAB | Facility: HOSPITAL | Age: 73
End: 2023-03-29
Payer: COMMERCIAL

## 2023-03-29 ENCOUNTER — OFFICE VISIT (OUTPATIENT)
Dept: FAMILY MEDICINE CLINIC | Age: 73
End: 2023-03-29
Payer: COMMERCIAL

## 2023-03-29 VITALS
DIASTOLIC BLOOD PRESSURE: 94 MMHG | OXYGEN SATURATION: 96 % | HEIGHT: 72 IN | WEIGHT: 241 LBS | SYSTOLIC BLOOD PRESSURE: 153 MMHG | HEART RATE: 88 BPM | BODY MASS INDEX: 32.64 KG/M2

## 2023-03-29 DIAGNOSIS — I10 PRIMARY HYPERTENSION: ICD-10-CM

## 2023-03-29 DIAGNOSIS — Z12.5 PROSTATE CANCER SCREENING: ICD-10-CM

## 2023-03-29 DIAGNOSIS — Z00.00 ANNUAL PHYSICAL EXAM: Primary | ICD-10-CM

## 2023-03-29 LAB
ALBUMIN SERPL-MCNC: 4.6 G/DL (ref 3.5–5.2)
ALBUMIN/GLOB SERPL: 1.6 G/DL
ALP SERPL-CCNC: 72 U/L (ref 39–117)
ALT SERPL W P-5'-P-CCNC: 23 U/L (ref 1–41)
ANION GAP SERPL CALCULATED.3IONS-SCNC: 7.5 MMOL/L (ref 5–15)
AST SERPL-CCNC: 20 U/L (ref 1–40)
BASOPHILS # BLD AUTO: 0.05 10*3/MM3 (ref 0–0.2)
BASOPHILS NFR BLD AUTO: 0.6 % (ref 0–1.5)
BILIRUB SERPL-MCNC: 0.4 MG/DL (ref 0–1.2)
BUN SERPL-MCNC: 15 MG/DL (ref 8–23)
BUN/CREAT SERPL: 16.9 (ref 7–25)
CALCIUM SPEC-SCNC: 9.7 MG/DL (ref 8.6–10.5)
CHLORIDE SERPL-SCNC: 101 MMOL/L (ref 98–107)
CHOLEST SERPL-MCNC: 192 MG/DL (ref 0–200)
CO2 SERPL-SCNC: 29.5 MMOL/L (ref 22–29)
CREAT SERPL-MCNC: 0.89 MG/DL (ref 0.76–1.27)
DEPRECATED RDW RBC AUTO: 42.1 FL (ref 37–54)
EGFRCR SERPLBLD CKD-EPI 2021: 92.2 ML/MIN/1.73
EOSINOPHIL # BLD AUTO: 0.51 10*3/MM3 (ref 0–0.4)
EOSINOPHIL NFR BLD AUTO: 6 % (ref 0.3–6.2)
ERYTHROCYTE [DISTWIDTH] IN BLOOD BY AUTOMATED COUNT: 13 % (ref 12.3–15.4)
GLOBULIN UR ELPH-MCNC: 2.8 GM/DL
GLUCOSE SERPL-MCNC: 92 MG/DL (ref 65–99)
HCT VFR BLD AUTO: 49.6 % (ref 37.5–51)
HDLC SERPL-MCNC: 56 MG/DL (ref 40–60)
HGB BLD-MCNC: 16.7 G/DL (ref 13–17.7)
IMM GRANULOCYTES # BLD AUTO: 0.03 10*3/MM3 (ref 0–0.05)
IMM GRANULOCYTES NFR BLD AUTO: 0.4 % (ref 0–0.5)
LDLC SERPL CALC-MCNC: 115 MG/DL (ref 0–100)
LDLC/HDLC SERPL: 2 {RATIO}
LYMPHOCYTES # BLD AUTO: 1.69 10*3/MM3 (ref 0.7–3.1)
LYMPHOCYTES NFR BLD AUTO: 20 % (ref 19.6–45.3)
MCH RBC QN AUTO: 29.7 PG (ref 26.6–33)
MCHC RBC AUTO-ENTMCNC: 33.7 G/DL (ref 31.5–35.7)
MCV RBC AUTO: 88.1 FL (ref 79–97)
MONOCYTES # BLD AUTO: 0.58 10*3/MM3 (ref 0.1–0.9)
MONOCYTES NFR BLD AUTO: 6.9 % (ref 5–12)
NEUTROPHILS NFR BLD AUTO: 5.57 10*3/MM3 (ref 1.7–7)
NEUTROPHILS NFR BLD AUTO: 66.1 % (ref 42.7–76)
PLATELET # BLD AUTO: 221 10*3/MM3 (ref 140–450)
PMV BLD AUTO: 8.9 FL (ref 6–12)
POTASSIUM SERPL-SCNC: 4.7 MMOL/L (ref 3.5–5.2)
PROT SERPL-MCNC: 7.4 G/DL (ref 6–8.5)
PSA SERPL-MCNC: 2.23 NG/ML (ref 0–4)
RBC # BLD AUTO: 5.63 10*6/MM3 (ref 4.14–5.8)
SODIUM SERPL-SCNC: 138 MMOL/L (ref 136–145)
TRIGL SERPL-MCNC: 119 MG/DL (ref 0–150)
VLDLC SERPL-MCNC: 21 MG/DL (ref 5–40)
WBC NRBC COR # BLD: 8.43 10*3/MM3 (ref 3.4–10.8)

## 2023-03-29 PROCEDURE — 99397 PER PM REEVAL EST PAT 65+ YR: CPT | Performed by: FAMILY MEDICINE

## 2023-03-29 PROCEDURE — 80061 LIPID PANEL: CPT

## 2023-03-29 PROCEDURE — 80053 COMPREHEN METABOLIC PANEL: CPT

## 2023-03-29 PROCEDURE — 85025 COMPLETE CBC W/AUTO DIFF WBC: CPT

## 2023-03-29 PROCEDURE — G0103 PSA SCREENING: HCPCS

## 2023-03-29 PROCEDURE — 36415 COLL VENOUS BLD VENIPUNCTURE: CPT

## 2023-03-29 RX ORDER — LOSARTAN POTASSIUM 25 MG/1
25 TABLET ORAL DAILY
Qty: 30 TABLET | Refills: 5 | Status: SHIPPED | OUTPATIENT
Start: 2023-03-29

## 2023-03-29 RX ORDER — AMLODIPINE BESYLATE 10 MG/1
TABLET ORAL
COMMUNITY
Start: 2023-03-21

## 2023-03-29 NOTE — ASSESSMENT & PLAN NOTE
He is UTD on colonoscopy, last done 1/2018 and this showed diverticulosis.  Ten year repeat recommended.  He is due for prostate cancer screening.  He is up-to-date on AAA screen, done 7/2020 and this was negative.  He is UTD on COVID (due for bivalent booster), Pneumovax (1/2019), Floglld46 (10/2017) and Td (7/2020).  He is due for Shingrix and flu.  Shingrix can be done at the pharmacy.  Flu declined.  He is due for routine labs including HTN panel, CBC and PSA; ordered.

## 2023-03-29 NOTE — ASSESSMENT & PLAN NOTE
Blood pressure has been well controlled at home, but he does note a new dry hacking cough occasionally.  May be due to lisinopril.  We will discontinue the lisinopril today and switch him over to losartan 25 mg daily.  He will continue to monitor for symptoms as well as check blood pressure at home and let me know if he continues to have problems.  No refills needed on amlodipine today.  Checking labs.  Continue to monitor.

## 2023-03-29 NOTE — PROGRESS NOTES
Chief Complaint  Annual Exam    Subjective          Percy Mcdaniel presents to [facility] today for his annual physical.      He is UTD on colonoscopy, last done 1/2018 and this showed diverticulosis.  Ten year repeat recommended.  He is due for prostate cancer screening.  He is up-to-date on AAA screen, done 7/2020 and this was negative.  He is UTD on COVID (due for bivalent booster), Pneumovax (1/2019), [id number] (10/2017) and Td (7/2020).  He is due for Shingrix and flu.  He is due for routine labs including HTN panel, CBC and PSA.    He is on amlodipine and lisinopril for hypertension.  Blood pressure has been well controlled.  No chest pain, palpitations or shortness of breath.  He has noticed a new little dry cough, although this may be due to allergies as it is sometimes productive.    Review of Systems   Constitutional: Negative for chills, fatigue and fever.   HENT: Negative for congestion, hearing loss and rhinorrhea.    Eyes: Negative for pain and visual disturbance.   Respiratory: Positive for cough. Negative for shortness of breath.    Cardiovascular: Negative for chest pain and palpitations.   Gastrointestinal: Negative for abdominal pain, constipation, diarrhea, nausea and vomiting.   Genitourinary: Negative for dysuria and hematuria.   Musculoskeletal: Negative for arthralgias and myalgias.   Skin: Negative for rash.   Neurological: Negative for weakness and numbness.   Psychiatric/Behavioral: Negative for dysphoric mood and sleep disturbance. The patient is not nervous/anxious.          Current Outpatient Medications:   •  amLODIPine (NORVASC) 10 MG tablet, , Disp: , Rfl:   •  VITAMIN D, CHOLECALCIFEROL, PO, Take  by mouth., Disp: , Rfl:   •  losartan (COZAAR) 25 MG tablet, Take 1 tablet by mouth Daily., Disp: 30 tablet, Rfl: 5    Allergies:  Patient has no known allergies.      Objective   Vital Signs:   Vitals:    03/29/23 1119 03/29/23 1144   BP: 158/89 153/94  "  BP Location: Left arm Left arm   Patient Position: Sitting Sitting   Cuff Size: Adult    Pulse: 89 88   SpO2: 96%    Weight: 109 kg (241 lb)    Height: 182.9 cm (72.01\")      Physical Exam  Vitals reviewed.   Constitutional:       General: He is not in acute distress.     Appearance: Normal appearance. He is well-developed.   HENT:      Head: Normocephalic and atraumatic.      Right Ear: External ear normal.      Left Ear: External ear normal.      Mouth/Throat:      Mouth: Mucous membranes are moist.   Eyes:      Extraocular Movements: Extraocular movements intact.      Conjunctiva/sclera: Conjunctivae normal.      Pupils: Pupils are equal, round, and reactive to light.   Cardiovascular:      Rate and Rhythm: Normal rate and regular rhythm.      Heart sounds: No murmur heard.  Pulmonary:      Effort: Pulmonary effort is normal.      Breath sounds: Normal breath sounds. No wheezing, rhonchi or rales.   Abdominal:      General: Bowel sounds are normal. There is no distension.      Palpations: Abdomen is soft.      Tenderness: There is no abdominal tenderness.   Musculoskeletal:         General: Normal range of motion.   Skin:     General: Skin is warm and dry.   Neurological:      Mental Status: He is alert and oriented to person, place, and time.      Deep Tendon Reflexes: Reflexes normal.   Psychiatric:         Mood and Affect: Mood and affect normal.         Behavior: Behavior normal.         Thought Content: Thought content normal.         Judgment: Judgment normal.             Assessment and Plan    Diagnoses and all orders for this visit:    1. Annual physical exam (Primary)  Assessment & Plan:  He is UTD on colonoscopy, last done 1/2018 and this showed diverticulosis.  Ten year repeat recommended.  He is due for prostate cancer screening.  He is up-to-date on AAA screen, done 7/2020 and this was negative.  He is UTD on COVID (due for bivalent booster), Pneumovax (1/2019), Ndsrhih91 (10/2017) and Td (7/2020). "  He is due for Shingrix and flu.  Shingrix can be done at the pharmacy.  Flu declined.  He is due for routine labs including HTN panel, CBC and PSA; ordered.      2. Primary hypertension  Assessment & Plan:  Blood pressure has been well controlled at home, but he does note a new dry hacking cough occasionally.  May be due to lisinopril.  We will discontinue the lisinopril today and switch him over to losartan 25 mg daily.  He will continue to monitor for symptoms as well as check blood pressure at home and let me know if he continues to have problems.  No refills needed on amlodipine today.  Checking labs.  Continue to monitor.    Orders:  -     Comprehensive Metabolic Panel; Future  -     Lipid Panel; Future  -     CBC Auto Differential; Future  -     losartan (COZAAR) 25 MG tablet; Take 1 tablet by mouth Daily.  Dispense: 30 tablet; Refill: 5    3. Prostate cancer screening  -     PSA Screen; Future      Follow Up   Return in about 1 year (around 3/29/2024) for Annual physical.  Patient was given instructions and counseling regarding his condition or for health maintenance advice. Please see specific information pulled into the AVS if appropriate.

## 2023-04-21 DIAGNOSIS — I10 PRIMARY HYPERTENSION: ICD-10-CM

## 2023-04-21 RX ORDER — LISINOPRIL 10 MG/1
TABLET ORAL
Qty: 30 TABLET | Refills: 5 | OUTPATIENT
Start: 2023-04-21

## 2023-05-19 DIAGNOSIS — I10 ESSENTIAL (PRIMARY) HYPERTENSION: ICD-10-CM

## 2023-05-19 RX ORDER — AMLODIPINE BESYLATE 10 MG/1
TABLET ORAL
Qty: 90 TABLET | Refills: 1 | Status: SHIPPED | OUTPATIENT
Start: 2023-05-19

## 2023-06-26 ENCOUNTER — TELEPHONE (OUTPATIENT)
Dept: FAMILY MEDICINE CLINIC | Age: 73
End: 2023-06-26

## 2023-09-18 DIAGNOSIS — I10 PRIMARY HYPERTENSION: ICD-10-CM

## 2023-09-19 RX ORDER — LOSARTAN POTASSIUM 25 MG/1
TABLET ORAL
Qty: 90 TABLET | Refills: 1 | Status: SHIPPED | OUTPATIENT
Start: 2023-09-19

## 2023-10-28 NOTE — TELEPHONE ENCOUNTER
Caller: ELENOGABY    Relationship: Emergency Contact    Best call back number: [phone]     What is the best time to reach you: ANY    Who are you requesting to speak with (clinical staff, provider,  specific staff member): CLINICAL      What was the call regarding: SPOUSE REPORTS THAT PATIENT WAS SEEN ON 03/29 FOR ANNUAL WELLNESS - IT WAS NOT CODED AS PREVENTATIVE.  SHE HAS SPOKE WITH BILLING, AND THEY TOLD HER TO CONTACT Natalie Pierce MD  TO HAVE THE VISIT RECODED, SO THE LABS CAN BE COVERED.    PLEASE ADVISE    
Please advise.  
Visit was coded as an >64 yo annual physical (92826) due to García's insurance being [place] [employer] and not Medicare.  Forwarding to Hilaria for assistance.  Thanks, MYRON  
.

## 2023-11-16 DIAGNOSIS — I10 ESSENTIAL (PRIMARY) HYPERTENSION: ICD-10-CM

## 2023-11-16 RX ORDER — AMLODIPINE BESYLATE 10 MG/1
TABLET ORAL
Qty: 90 TABLET | Refills: 1 | Status: SHIPPED | OUTPATIENT
Start: 2023-11-16

## 2024-03-21 DIAGNOSIS — I10 PRIMARY HYPERTENSION: ICD-10-CM

## 2024-03-22 RX ORDER — LOSARTAN POTASSIUM 25 MG/1
TABLET ORAL
Qty: 90 TABLET | Refills: 1 | Status: SHIPPED | OUTPATIENT
Start: 2024-03-22

## 2024-04-18 ENCOUNTER — OFFICE VISIT (OUTPATIENT)
Dept: FAMILY MEDICINE CLINIC | Age: 74
End: 2024-04-18
Payer: COMMERCIAL

## 2024-04-18 VITALS
DIASTOLIC BLOOD PRESSURE: 74 MMHG | HEIGHT: 72 IN | WEIGHT: 245.2 LBS | BODY MASS INDEX: 33.21 KG/M2 | OXYGEN SATURATION: 94 % | SYSTOLIC BLOOD PRESSURE: 143 MMHG | HEART RATE: 82 BPM

## 2024-04-18 DIAGNOSIS — Z12.5 PROSTATE CANCER SCREENING: ICD-10-CM

## 2024-04-18 DIAGNOSIS — Z00.00 ANNUAL PHYSICAL EXAM: Primary | ICD-10-CM

## 2024-04-18 DIAGNOSIS — I10 PRIMARY HYPERTENSION: ICD-10-CM

## 2024-04-18 PROCEDURE — 99397 PER PM REEVAL EST PAT 65+ YR: CPT | Performed by: FAMILY MEDICINE

## 2024-04-18 RX ORDER — KETOCONAZOLE 20 MG/G
1 CREAM TOPICAL DAILY
COMMUNITY
Start: 2024-02-27 | End: 2024-04-18

## 2024-04-18 RX ORDER — HYDROCHLOROTHIAZIDE 50 MG/1
50 TABLET ORAL DAILY
Qty: 90 TABLET | Refills: 1 | Status: SHIPPED | OUTPATIENT
Start: 2024-04-18

## 2024-04-18 RX ORDER — HYDROCHLOROTHIAZIDE 25 MG/1
25 TABLET ORAL AS NEEDED
COMMUNITY
End: 2024-04-18 | Stop reason: SDUPTHER

## 2024-04-18 RX ORDER — LOSARTAN POTASSIUM 50 MG/1
50 TABLET ORAL DAILY
Qty: 90 TABLET | Refills: 1 | Status: SHIPPED | OUTPATIENT
Start: 2024-04-18

## 2024-04-18 NOTE — PROGRESS NOTES
Chief Complaint  Annual Exam    Subjective          Percy Mcdaniel presents to [facility] today for his annual physical.      He is UTD on colonoscopy, last done 1/2018 and this showed diverticulosis.  Ten year repeat recommended.  He is due for prostate cancer screening.  He is up-to-date on AAA screen, done 7/2020 and this was negative.  He is UTD on Pneumovax (1/2019), [address]47 (10/2017) and Td (7/2020).  He is due for COVID, Prevnar 20, RSV, Shingrix.  Flu shot not currently indicated.  He is due for routine labs including HTN panel, CBC and PSA.    He does not have an advanced care directive.      He is on amlodipine and losartan for HTN.  Blood pressure has been well controlled.  He has noticed swelling in the ankles for the past 8-9 months, worse with increased sedentary activity.  Denies chest pain, palpitations or shortness of breath.  He has previously been on lisinopril (dry cough).     He has had been diagnosed with a couple of BCCs, one on the L shoulder and one on the nose.  Going back for Moh's on the nose site in the next couple of weeks.    Review of Systems   Constitutional:  Negative for chills, fatigue and fever.   HENT:  Negative for congestion, hearing loss and rhinorrhea.    Eyes:  Negative for pain and visual disturbance.   Respiratory:  Negative for cough and shortness of breath.    Cardiovascular:  Positive for leg swelling (bilateral ankle). Negative for chest pain and palpitations.   Gastrointestinal:  Negative for abdominal pain, constipation, diarrhea, nausea and vomiting.   Genitourinary:  Negative for dysuria and hematuria.   Musculoskeletal:  Negative for arthralgias and myalgias.   Skin:  Negative for rash.   Neurological:  Negative for weakness and numbness.   Psychiatric/Behavioral:  Negative for dysphoric mood and sleep disturbance. The patient is not nervous/anxious.          Current Outpatient Medications:     hydroCHLOROthiazide 50 MG  "tablet, Take 1 tablet by mouth Daily. As need for swelling, Disp: 90 tablet, Rfl: 1    losartan (COZAAR) 50 MG tablet, Take 1 tablet by mouth Daily., Disp: 90 tablet, Rfl: 1    VITAMIN D, CHOLECALCIFEROL, PO, Take  by mouth., Disp: , Rfl:     Allergies:  Patient has no known allergies.      Objective   Vital Signs:   Vitals:    04/18/24 0919   BP: 148/78   BP Location: Right arm   Patient Position: Sitting   Pulse: 85   SpO2: 94%  Comment: room air   Weight: 111 kg (245 lb 3.2 oz)   Height: 182.9 cm (72.01\")     Physical Exam  Vitals reviewed.   Constitutional:       General: He is not in acute distress.     Appearance: Normal appearance. He is well-developed.   HENT:      Head: Normocephalic and atraumatic.      Right Ear: External ear normal.      Left Ear: External ear normal.      Mouth/Throat:      Mouth: Mucous membranes are moist.   Eyes:      Extraocular Movements: Extraocular movements intact.      Conjunctiva/sclera: Conjunctivae normal.      Pupils: Pupils are equal, round, and reactive to light.   Cardiovascular:      Rate and Rhythm: Normal rate and regular rhythm.      Heart sounds: No murmur heard.  Pulmonary:      Effort: Pulmonary effort is normal.      Breath sounds: Normal breath sounds. No wheezing, rhonchi or rales.   Abdominal:      General: Bowel sounds are normal. There is no distension.      Palpations: Abdomen is soft.      Tenderness: There is no abdominal tenderness.   Musculoskeletal:         General: Normal range of motion.   Skin:     General: Skin is warm and dry.   Neurological:      Mental Status: He is alert and oriented to person, place, and time.      Deep Tendon Reflexes: Reflexes normal.   Psychiatric:         Mood and Affect: Mood and affect normal.         Behavior: Behavior normal.         Thought Content: Thought content normal.         Judgment: Judgment normal.      Lab Results   Component Value Date    GLUCOSE 92 03/29/2023    BUN 15 03/29/2023    CREATININE 0.89 " 03/29/2023    EGFRRESULT 82 03/09/2022    EGFR 92.2 03/29/2023    BCR 16.9 03/29/2023    K 4.7 03/29/2023    CO2 29.5 (H) 03/29/2023    CALCIUM 9.7 03/29/2023    PROTENTOTREF 7.0 03/09/2022    ALBUMIN 4.6 03/29/2023    BILITOT 0.4 03/29/2023    AST 20 03/29/2023    ALT 23 03/29/2023       Lab Results   Component Value Date    CHOL 192 03/29/2023    CHLPL 213 (H) 03/09/2022    TRIG 119 03/29/2023    HDL 56 03/29/2023     (H) 03/29/2023       Lab Results   Component Value Date    WBC 8.43 03/29/2023    HGB 16.7 03/29/2023    HCT 49.6 03/29/2023    MCV 88.1 03/29/2023     03/29/2023          Assessment and Plan    Assessment & Plan  Annual physical exam  He is UTD on colonoscopy, last done 1/2018 and this showed diverticulosis.  Ten year repeat recommended.  He is due for prostate cancer screening; PSA ordered.  He is up-to-date on AAA screen, done 7/2020 and this was negative.  He is UTD on Pneumovax (1/2019), Riyepgu56 (10/2017) and Td (7/2020).  He is due for COVID, Prevnar 20, RSV, Shingrix.  COVID declined.  Shingrix and RSV can be done at the pharmacy.  [id number] declined today.  Flu shot not currently indicated.  He is due for routine labs including HTN panel, CBC and PSA; ordered.  Primary hypertension  Amlodipine is the likely source of his newfound ankle swelling.  Discontinuing that today and we will initiate HCTZ at 50 mg daily (he has been taking 25 mg daily for the past 5-6 months to try to alleviate the swelling and BP is borderline today).  He is also on losartan 25 mg daily, which we will increase to 50 mg to compensate for the loss of amlodipine.    Prostate cancer screening      Orders Placed This Encounter   Procedures    Comprehensive Metabolic Panel    Lipid Panel    CBC Auto Differential    PSA Screen     New Medications Ordered This Visit   Medications    hydroCHLOROthiazide 50 MG tablet     Sig: Take 1 tablet by mouth Daily. As need for swelling     Dispense:  90 tablet      Refill:  1    losartan (COZAAR) 50 MG tablet     Sig: Take 1 tablet by mouth Daily.     Dispense:  90 tablet     Refill:  1         Follow Up   Return in about 1 year (around 4/18/2025) for Annual physical.  Patient was given instructions and counseling regarding his condition or for health maintenance advice. Please see specific information pulled into the AVS if appropriate.

## 2024-04-18 NOTE — ASSESSMENT & PLAN NOTE
Amlodipine is the likely source of his newfound ankle swelling.  Discontinuing that today and we will initiate HCTZ at 50 mg daily (he has been taking 25 mg daily for the past 5-6 months to try to alleviate the swelling and BP is borderline today).  He is also on losartan 25 mg daily, which we will increase to 50 mg to compensate for the loss of amlodipine.

## 2024-04-18 NOTE — ASSESSMENT & PLAN NOTE
He is UTD on colonoscopy, last done 1/2018 and this showed diverticulosis.  Ten year repeat recommended.  He is due for prostate cancer screening; PSA ordered.  He is up-to-date on AAA screen, done 7/2020 and this was negative.  He is UTD on Pneumovax (1/2019), Ffstlni70 (10/2017) and Td (7/2020).  He is due for COVID, Prevnar 20, RSV, Shingrix.  COVID declined.  Shingrix and RSV can be done at the pharmacy.  Sxkqcif55 declined today.  Flu shot not currently indicated.  He is due for routine labs including HTN panel, CBC and PSA; ordered.

## 2024-04-19 LAB
ALBUMIN SERPL-MCNC: 4.6 G/DL (ref 3.8–4.8)
ALBUMIN/GLOB SERPL: 1.7 {RATIO} (ref 1.2–2.2)
ALP SERPL-CCNC: 83 IU/L (ref 44–121)
ALT SERPL-CCNC: 28 IU/L (ref 0–44)
AMBIG ABBREV CMP14 DEFAULT: NORMAL
AMBIG ABBREV LP DEFAULT: NORMAL
AST SERPL-CCNC: 22 IU/L (ref 0–40)
BASOPHILS # BLD AUTO: 0.1 X10E3/UL (ref 0–0.2)
BASOPHILS NFR BLD AUTO: 1 %
BILIRUB SERPL-MCNC: 0.8 MG/DL (ref 0–1.2)
BUN SERPL-MCNC: 16 MG/DL (ref 8–27)
BUN/CREAT SERPL: 16 (ref 10–24)
CALCIUM SERPL-MCNC: 9.4 MG/DL (ref 8.6–10.2)
CHLORIDE SERPL-SCNC: 98 MMOL/L (ref 96–106)
CHOLEST SERPL-MCNC: 223 MG/DL (ref 100–199)
CO2 SERPL-SCNC: 26 MMOL/L (ref 20–29)
CREAT SERPL-MCNC: 1.01 MG/DL (ref 0.76–1.27)
EGFRCR SERPLBLD CKD-EPI 2021: 80 ML/MIN/1.73
EOSINOPHIL # BLD AUTO: 0.2 X10E3/UL (ref 0–0.4)
EOSINOPHIL NFR BLD AUTO: 3 %
ERYTHROCYTE [DISTWIDTH] IN BLOOD BY AUTOMATED COUNT: 13.5 % (ref 11.6–15.4)
GLOBULIN SER CALC-MCNC: 2.7 G/DL (ref 1.5–4.5)
GLUCOSE SERPL-MCNC: 96 MG/DL (ref 70–99)
HCT VFR BLD AUTO: 50.3 % (ref 37.5–51)
HDLC SERPL-MCNC: 61 MG/DL
HGB BLD-MCNC: 17 G/DL (ref 13–17.7)
IMM GRANULOCYTES # BLD AUTO: 0 X10E3/UL (ref 0–0.1)
IMM GRANULOCYTES NFR BLD AUTO: 0 %
LDLC SERPL CALC-MCNC: 147 MG/DL (ref 0–99)
LYMPHOCYTES # BLD AUTO: 1.9 X10E3/UL (ref 0.7–3.1)
LYMPHOCYTES NFR BLD AUTO: 21 %
MCH RBC QN AUTO: 29.8 PG (ref 26.6–33)
MCHC RBC AUTO-ENTMCNC: 33.8 G/DL (ref 31.5–35.7)
MCV RBC AUTO: 88 FL (ref 79–97)
MONOCYTES # BLD AUTO: 0.7 X10E3/UL (ref 0.1–0.9)
MONOCYTES NFR BLD AUTO: 7 %
NEUTROPHILS # BLD AUTO: 6.2 X10E3/UL (ref 1.4–7)
NEUTROPHILS NFR BLD AUTO: 68 %
PLATELET # BLD AUTO: 250 X10E3/UL (ref 150–450)
POTASSIUM SERPL-SCNC: 4 MMOL/L (ref 3.5–5.2)
PROT SERPL-MCNC: 7.3 G/DL (ref 6–8.5)
PSA SERPL-MCNC: 2.3 NG/ML (ref 0–4)
RBC # BLD AUTO: 5.71 X10E6/UL (ref 4.14–5.8)
SODIUM SERPL-SCNC: 139 MMOL/L (ref 134–144)
TRIGL SERPL-MCNC: 84 MG/DL (ref 0–149)
VLDLC SERPL CALC-MCNC: 15 MG/DL (ref 5–40)
WBC # BLD AUTO: 9.1 X10E3/UL (ref 3.4–10.8)

## 2024-05-20 ENCOUNTER — TELEPHONE (OUTPATIENT)
Dept: FAMILY MEDICINE CLINIC | Age: 74
End: 2024-05-20
Payer: COMMERCIAL

## 2024-09-13 DIAGNOSIS — I10 PRIMARY HYPERTENSION: ICD-10-CM

## 2024-09-13 RX ORDER — HYDROCHLOROTHIAZIDE 50 MG/1
50 TABLET ORAL DAILY PRN
Qty: 90 TABLET | Refills: 1 | Status: SHIPPED | OUTPATIENT
Start: 2024-09-13

## 2024-10-12 DIAGNOSIS — I10 PRIMARY HYPERTENSION: ICD-10-CM

## 2024-10-15 RX ORDER — LOSARTAN POTASSIUM 50 MG/1
50 TABLET ORAL DAILY
Qty: 90 TABLET | Refills: 1 | Status: SHIPPED | OUTPATIENT
Start: 2024-10-15

## 2025-01-09 ENCOUNTER — TELEPHONE (OUTPATIENT)
Dept: FAMILY MEDICINE CLINIC | Age: 75
End: 2025-01-09
Payer: COMMERCIAL

## 2025-01-09 NOTE — TELEPHONE ENCOUNTER
Patient called stating the labs from 4/18/24 need to be re-coded under preventative not a dx in order for her insurance to cover it at 100%.  He needs a copy of this once it is corrected so she can send it to [facility].  Please advise.

## 2025-01-27 ENCOUNTER — TELEPHONE (OUTPATIENT)
Dept: FAMILY MEDICINE CLINIC | Age: 75
End: 2025-01-27
Payer: COMMERCIAL

## 2025-01-27 NOTE — TELEPHONE ENCOUNTER
1/27 attempted to call pt regarding overdue lipid lab ordered 1/12. Phone number disconnected.  1st attempt

## 2025-03-14 DIAGNOSIS — I10 PRIMARY HYPERTENSION: ICD-10-CM

## 2025-03-14 RX ORDER — HYDROCHLOROTHIAZIDE 50 MG/1
50 TABLET ORAL DAILY PRN
Qty: 90 TABLET | Refills: 1 | Status: SHIPPED | OUTPATIENT
Start: 2025-03-14

## 2025-04-21 DIAGNOSIS — I10 PRIMARY HYPERTENSION: ICD-10-CM

## 2025-04-22 RX ORDER — LOSARTAN POTASSIUM 50 MG/1
50 TABLET ORAL DAILY
Qty: 90 TABLET | Refills: 1 | Status: SHIPPED | OUTPATIENT
Start: 2025-04-22

## 2025-05-09 ENCOUNTER — OFFICE VISIT (OUTPATIENT)
Dept: FAMILY MEDICINE CLINIC | Age: 75
End: 2025-05-09
Payer: COMMERCIAL

## 2025-05-09 ENCOUNTER — HOSPITAL ENCOUNTER (OUTPATIENT)
Dept: GENERAL RADIOLOGY | Facility: HOSPITAL | Age: 75
Discharge: HOME | End: 2025-05-09
Admitting: FAMILY MEDICINE
Payer: COMMERCIAL

## 2025-05-09 VITALS
BODY MASS INDEX: 33.59 KG/M2 | DIASTOLIC BLOOD PRESSURE: 88 MMHG | HEART RATE: 74 BPM | SYSTOLIC BLOOD PRESSURE: 171 MMHG | OXYGEN SATURATION: 97 % | HEIGHT: 72 IN | WEIGHT: 248 LBS | TEMPERATURE: 97.7 F

## 2025-05-09 DIAGNOSIS — R22.41 MASS OF RIGHT ANKLE: ICD-10-CM

## 2025-05-09 DIAGNOSIS — Z23 ENCOUNTER FOR IMMUNIZATION: ICD-10-CM

## 2025-05-09 DIAGNOSIS — Z12.5 PROSTATE CANCER SCREENING: ICD-10-CM

## 2025-05-09 DIAGNOSIS — Z00.00 ANNUAL PHYSICAL EXAM: Primary | ICD-10-CM

## 2025-05-09 DIAGNOSIS — I10 PRIMARY HYPERTENSION: ICD-10-CM

## 2025-05-09 PROCEDURE — 73600 X-RAY EXAM OF ANKLE: CPT

## 2025-05-09 RX ORDER — HYDROCHLOROTHIAZIDE 50 MG/1
50 TABLET ORAL DAILY PRN
Qty: 90 TABLET | Refills: 3 | Status: SHIPPED | OUTPATIENT
Start: 2025-05-09

## 2025-05-09 RX ORDER — LOSARTAN POTASSIUM 50 MG/1
50 TABLET ORAL DAILY
Qty: 90 TABLET | Refills: 3 | Status: SHIPPED | OUTPATIENT
Start: 2025-05-09

## 2025-05-09 RX ORDER — FEXOFENADINE HCL 180 MG/1
180 TABLET ORAL DAILY
COMMUNITY

## 2025-05-09 NOTE — ASSESSMENT & PLAN NOTE
Blood pressure has been running at goal.  Continue HCTZ 50 mg daily prn and losartan 50 mg daily.  Refills were needed today.  Labs were needed today.  Continue to monitor.  Orders:    hydroCHLOROthiazide 50 MG tablet; Take 1 tablet by mouth Daily As Needed (swelling). for swelling.    losartan (COZAAR) 50 MG tablet; Take 1 tablet by mouth Daily.

## 2025-05-09 NOTE — PROGRESS NOTES
Chief Complaint  Annual Exam    Subjective          Percy Mcdaniel presents to [facility] today for his annual physical.      He is UTD on colonoscopy, last done 1/2018 and this showed diverticulosis.  Ten year repeat recommended.  He is due for prostate cancer screening.  He is up-to-date on AAA screen, done 7/2020 and this was negative.  He is UTD on Pneumovax (1/2019), Rdxfplr89 (10/2017) Shingrix (4/2024, 6/2024) and Td (7/2020).  He is due for COVID, Prevnar 20.  Flu shot not currently indicated.  He is due for routine labs including HTN panel and PSA.    He is on amlodipine and losartan for HTN.  He has previously been on lisinopril (dry cough).  BP is running high today.  He checks BP regularly at home and generally runs 130s systolic, sometimes going up to 140-150s.  No CP, palpitations, SOB.      He has a lump on his R heel, noticed for the first time about 3-4 weeks ago.  It is tender to touch.  No erythema or warmth.  No change in size since discovery.  No trauma or injury.    He had Moh's procedure done to R nose.  Also had some lesions removed from the back.  All came back as BCC.    He goes back to [facility] in a few months for follow-up.    Review of Systems   Constitutional:  Negative for chills, fatigue and fever.   HENT:  Negative for congestion, hearing loss and rhinorrhea.    Eyes:  Negative for pain and visual disturbance.   Respiratory:  Negative for cough and shortness of breath.    Cardiovascular:  Negative for chest pain and palpitations.   Gastrointestinal:  Negative for abdominal pain, constipation, diarrhea, nausea and vomiting.   Genitourinary:  Negative for dysuria and hematuria.   Musculoskeletal:  Positive for arthralgias. Negative for myalgias.        Lump on back of R heel   Skin:  Negative for rash.   Neurological:  Negative for weakness and numbness.   Psychiatric/Behavioral:  Negative for dysphoric mood and sleep disturbance. The patient is not  "nervous/anxious.          Current Outpatient Medications:     fexofenadine (ALLEGRA) 180 MG tablet, Take 1 tablet by mouth Daily., Disp: , Rfl:     hydroCHLOROthiazide 50 MG tablet, Take 1 tablet by mouth Daily As Needed (swelling). for swelling., Disp: 90 tablet, Rfl: 3    losartan (COZAAR) 50 MG tablet, Take 1 tablet by mouth Daily., Disp: 90 tablet, Rfl: 3    Allergies:  Patient has no known allergies.      Objective   Vital Signs:   Vitals:    05/09/25 1423   BP: 174/80   BP Location: Right arm   Patient Position: Sitting   Cuff Size: Large Adult   Pulse: 74   Temp: 97.7 °F (36.5 °C)   TempSrc: Oral   SpO2: 97%   Weight: 112 kg (248 lb)   Height: 182.9 cm (72.01\")     Body mass index is 33.63 kg/m².  BMI is >= 30 and <35. (Class 1 Obesity). The following options were offered after discussion;: exercise counseling/recommendations and nutrition counseling/recommendations      Physical Exam  Vitals reviewed.   Constitutional:       General: He is not in acute distress.     Appearance: Normal appearance. He is well-developed.   HENT:      Head: Normocephalic and atraumatic.      Right Ear: External ear normal.      Left Ear: External ear normal.      Mouth/Throat:      Mouth: Mucous membranes are moist.   Eyes:      Extraocular Movements: Extraocular movements intact.      Conjunctiva/sclera: Conjunctivae normal.      Pupils: Pupils are equal, round, and reactive to light.   Cardiovascular:      Rate and Rhythm: Normal rate and regular rhythm.      Heart sounds: No murmur heard.  Pulmonary:      Effort: Pulmonary effort is normal.      Breath sounds: Normal breath sounds. No wheezing, rhonchi or rales.   Abdominal:      General: Bowel sounds are normal. There is no distension.      Palpations: Abdomen is soft.      Tenderness: There is no abdominal tenderness.   Musculoskeletal:         General: Normal range of motion.   Skin:     General: Skin is warm and dry.   Neurological:      Mental Status: He is alert and " oriented to person, place, and time.      Deep Tendon Reflexes: Reflexes normal.   Psychiatric:         Mood and Affect: Mood and affect normal.         Behavior: Behavior normal.         Thought Content: Thought content normal.         Judgment: Judgment normal.          Lab Results   Component Value Date    GLUCOSE 96 04/18/2024    BUN 16 04/18/2024    CREATININE 1.01 04/18/2024    EGFR 80 04/18/2024    BCR 16 04/18/2024    K 4.0 04/18/2024    CO2 26 04/18/2024    CALCIUM 9.4 04/18/2024    ALBUMIN 4.6 04/18/2024    BILITOT 0.8 04/18/2024    AST 22 04/18/2024    ALT 28 04/18/2024       Lab Results   Component Value Date    CHOL 192 03/29/2023    CHLPL 223 (H) 04/18/2024    TRIG 84 04/18/2024    HDL 61 04/18/2024     (H) 04/18/2024       Lab Results   Component Value Date    WBC 9.1 04/18/2024    HGB 17.0 04/18/2024    HCT 50.3 04/18/2024    MCV 88 04/18/2024     04/18/2024            Assessment and Plan    Assessment & Plan  Annual physical exam  He is UTD on colonoscopy, last done 1/2018 and this showed diverticulosis.  Ten year repeat recommended.  He is due for prostate cancer screening; PSA ordered.  He is up-to-date on AAA screen, done 7/2020 and this was negative.  He is UTD on Pneumovax (1/2019), Rdxgppv92 (10/2017) Shingrix (4/2024, 6/2024) and Td (7/2020).  He is due for COVID, Prevnar 20.  Bqghxzg60 given today.  COVID declined.  Flu shot not currently indicated.  He is due for routine labs including HTN panel and PSA; ordered.  Orders:    Comprehensive Metabolic Panel; Future    Lipid Panel; Future    Primary hypertension    Blood pressure has been running at goal.  Continue HCTZ 50 mg daily prn and losartan 50 mg daily.  Refills were needed today.  Labs were needed today.  Continue to monitor.  Orders:    hydroCHLOROthiazide 50 MG tablet; Take 1 tablet by mouth Daily As Needed (swelling). for swelling.    losartan (COZAAR) 50 MG tablet; Take 1 tablet by mouth Daily.    Mass of right  ankle  Mildly tender, hard prominence on the back of the R ankle.  Has been present at least 3-4 weeks.  No injury or trauma.  Checking XR to evaluate.  Orders:    XR Ankle 2 View Right; Future    Prostate cancer screening    Orders:    PSA Screen; Future    Encounter for immunization    Orders:    Pneumococcal Conjugate Vaccine 20-Valent All              Follow Up   Return in about 1 year (around 5/9/2026) for Annual physical.  Patient was given instructions and counseling regarding his condition or for health maintenance advice. Please see specific information pulled into the AVS if appropriate.         05/09/2025

## 2025-05-14 ENCOUNTER — LAB (OUTPATIENT)
Dept: LAB | Facility: HOSPITAL | Age: 75
End: 2025-05-14
Payer: COMMERCIAL

## 2025-05-14 DIAGNOSIS — Z12.5 PROSTATE CANCER SCREENING: ICD-10-CM

## 2025-05-14 DIAGNOSIS — Z00.00 ANNUAL PHYSICAL EXAM: ICD-10-CM

## 2025-05-14 LAB
ALBUMIN SERPL-MCNC: 4.2 G/DL (ref 3.5–5.2)
ALBUMIN/GLOB SERPL: 1.4 G/DL
ALP SERPL-CCNC: 78 U/L (ref 39–117)
ALT SERPL W P-5'-P-CCNC: 22 U/L (ref 1–41)
ANION GAP SERPL CALCULATED.3IONS-SCNC: 10.1 MMOL/L (ref 5–15)
AST SERPL-CCNC: 25 U/L (ref 1–40)
BILIRUB SERPL-MCNC: 1 MG/DL (ref 0–1.2)
BUN SERPL-MCNC: 18 MG/DL (ref 8–23)
BUN/CREAT SERPL: 17.5 (ref 7–25)
CALCIUM SPEC-SCNC: 8.9 MG/DL (ref 8.6–10.5)
CHLORIDE SERPL-SCNC: 97 MMOL/L (ref 98–107)
CHOLEST SERPL-MCNC: 163 MG/DL (ref 0–200)
CO2 SERPL-SCNC: 28.9 MMOL/L (ref 22–29)
CREAT SERPL-MCNC: 1.03 MG/DL (ref 0.76–1.27)
EGFRCR SERPLBLD CKD-EPI 2021: 77.2 ML/MIN/1.73
GLOBULIN UR ELPH-MCNC: 2.9 GM/DL
GLUCOSE SERPL-MCNC: 98 MG/DL (ref 65–99)
HDLC SERPL-MCNC: 58 MG/DL (ref 40–60)
LDLC SERPL CALC-MCNC: 92 MG/DL (ref 0–100)
LDLC/HDLC SERPL: 1.58 {RATIO}
POTASSIUM SERPL-SCNC: 3.5 MMOL/L (ref 3.5–5.2)
PROT SERPL-MCNC: 7.1 G/DL (ref 6–8.5)
PSA SERPL-MCNC: 2.76 NG/ML (ref 0–4)
SODIUM SERPL-SCNC: 136 MMOL/L (ref 136–145)
TRIGL SERPL-MCNC: 66 MG/DL (ref 0–150)
VLDLC SERPL-MCNC: 13 MG/DL (ref 5–40)

## 2025-05-14 PROCEDURE — G0103 PSA SCREENING: HCPCS

## 2025-05-14 PROCEDURE — 36415 COLL VENOUS BLD VENIPUNCTURE: CPT

## 2025-05-14 PROCEDURE — 80061 LIPID PANEL: CPT

## 2025-05-14 PROCEDURE — 80053 COMPREHEN METABOLIC PANEL: CPT
